# Patient Record
Sex: MALE | Race: WHITE | NOT HISPANIC OR LATINO
[De-identification: names, ages, dates, MRNs, and addresses within clinical notes are randomized per-mention and may not be internally consistent; named-entity substitution may affect disease eponyms.]

---

## 2017-01-02 ENCOUNTER — FORM ENCOUNTER (OUTPATIENT)
Age: 67
End: 2017-01-02

## 2017-01-03 ENCOUNTER — APPOINTMENT (OUTPATIENT)
Dept: MRI IMAGING | Facility: CLINIC | Age: 67
End: 2017-01-03

## 2017-01-03 ENCOUNTER — OUTPATIENT (OUTPATIENT)
Dept: OUTPATIENT SERVICES | Facility: HOSPITAL | Age: 67
LOS: 1 days | End: 2017-01-03
Payer: MEDICARE

## 2017-01-03 DIAGNOSIS — Z00.8 ENCOUNTER FOR OTHER GENERAL EXAMINATION: ICD-10-CM

## 2017-01-03 PROCEDURE — 73721 MRI JNT OF LWR EXTRE W/O DYE: CPT

## 2017-01-03 PROCEDURE — 72148 MRI LUMBAR SPINE W/O DYE: CPT

## 2017-01-05 DIAGNOSIS — M43.16 SPONDYLOLISTHESIS, LUMBAR REGION: ICD-10-CM

## 2017-01-05 DIAGNOSIS — M17.12 UNILATERAL PRIMARY OSTEOARTHRITIS, LEFT KNEE: ICD-10-CM

## 2017-01-05 DIAGNOSIS — M51.26 OTHER INTERVERTEBRAL DISC DISPLACEMENT, LUMBAR REGION: ICD-10-CM

## 2017-01-05 DIAGNOSIS — M48.06 SPINAL STENOSIS, LUMBAR REGION: ICD-10-CM

## 2017-01-26 ENCOUNTER — APPOINTMENT (OUTPATIENT)
Dept: ORTHOPEDIC SURGERY | Facility: CLINIC | Age: 67
End: 2017-01-26

## 2017-02-13 ENCOUNTER — APPOINTMENT (OUTPATIENT)
Dept: ORTHOPEDIC SURGERY | Facility: CLINIC | Age: 67
End: 2017-02-13

## 2017-03-07 ENCOUNTER — APPOINTMENT (OUTPATIENT)
Dept: NEUROSURGERY | Facility: CLINIC | Age: 67
End: 2017-03-07

## 2017-03-07 VITALS
SYSTOLIC BLOOD PRESSURE: 132 MMHG | DIASTOLIC BLOOD PRESSURE: 80 MMHG | BODY MASS INDEX: 27.35 KG/M2 | WEIGHT: 220 LBS | HEIGHT: 75 IN | HEART RATE: 65 BPM

## 2017-03-07 DIAGNOSIS — M47.817 SPONDYLOSIS W/OUT MYELOPATHY OR RADICULOPATHY, LUMBOSACRAL REGION: ICD-10-CM

## 2017-03-07 DIAGNOSIS — M54.16 RADICULOPATHY, LUMBAR REGION: ICD-10-CM

## 2017-03-20 ENCOUNTER — APPOINTMENT (OUTPATIENT)
Dept: NEUROSURGERY | Facility: CLINIC | Age: 67
End: 2017-03-20

## 2017-03-23 ENCOUNTER — APPOINTMENT (OUTPATIENT)
Dept: ORTHOPEDIC SURGERY | Facility: CLINIC | Age: 67
End: 2017-03-23

## 2017-03-23 DIAGNOSIS — M48.07 SPINAL STENOSIS, LUMBOSACRAL REGION: ICD-10-CM

## 2018-03-21 ENCOUNTER — TRANSCRIPTION ENCOUNTER (OUTPATIENT)
Age: 68
End: 2018-03-21

## 2018-05-16 ENCOUNTER — APPOINTMENT (OUTPATIENT)
Dept: ORTHOPEDIC SURGERY | Facility: CLINIC | Age: 68
End: 2018-05-16
Payer: MEDICARE

## 2018-05-16 VITALS
HEART RATE: 76 BPM | HEIGHT: 75 IN | SYSTOLIC BLOOD PRESSURE: 129 MMHG | DIASTOLIC BLOOD PRESSURE: 71 MMHG | BODY MASS INDEX: 27.35 KG/M2 | WEIGHT: 220 LBS

## 2018-05-16 PROCEDURE — 73565 X-RAY EXAM OF KNEES: CPT

## 2018-05-16 PROCEDURE — 73560 X-RAY EXAM OF KNEE 1 OR 2: CPT | Mod: LT

## 2018-05-16 PROCEDURE — 99213 OFFICE O/P EST LOW 20 MIN: CPT

## 2018-06-08 ENCOUNTER — APPOINTMENT (OUTPATIENT)
Dept: ORTHOPEDIC SURGERY | Facility: CLINIC | Age: 68
End: 2018-06-08
Payer: MEDICARE

## 2018-06-08 VITALS
DIASTOLIC BLOOD PRESSURE: 75 MMHG | HEART RATE: 68 BPM | SYSTOLIC BLOOD PRESSURE: 116 MMHG | WEIGHT: 220 LBS | HEIGHT: 75 IN | BODY MASS INDEX: 27.35 KG/M2

## 2018-06-08 PROCEDURE — 20610 DRAIN/INJ JOINT/BURSA W/O US: CPT | Mod: LT

## 2018-06-08 PROCEDURE — 99213 OFFICE O/P EST LOW 20 MIN: CPT | Mod: 25

## 2018-06-08 RX ORDER — AMOXICILLIN 875 MG/1
875 TABLET, FILM COATED ORAL
Qty: 15 | Refills: 0 | Status: COMPLETED | COMMUNITY
Start: 2018-01-26

## 2018-06-08 RX ORDER — AMOXICILLIN AND CLAVULANATE POTASSIUM 875; 125 MG/1; MG/1
875-125 TABLET, COATED ORAL
Qty: 14 | Refills: 0 | Status: COMPLETED | COMMUNITY
Start: 2018-02-25

## 2018-06-08 RX ORDER — OSELTAMIVIR PHOSPHATE 75 MG/1
75 CAPSULE ORAL
Qty: 10 | Refills: 0 | Status: COMPLETED | COMMUNITY
Start: 2018-03-21

## 2018-06-15 ENCOUNTER — APPOINTMENT (OUTPATIENT)
Dept: ORTHOPEDIC SURGERY | Facility: CLINIC | Age: 68
End: 2018-06-15
Payer: MEDICARE

## 2018-06-15 PROCEDURE — 20610 DRAIN/INJ JOINT/BURSA W/O US: CPT | Mod: LT

## 2018-06-22 ENCOUNTER — APPOINTMENT (OUTPATIENT)
Dept: ORTHOPEDIC SURGERY | Facility: CLINIC | Age: 68
End: 2018-06-22

## 2018-06-29 ENCOUNTER — APPOINTMENT (OUTPATIENT)
Dept: ORTHOPEDIC SURGERY | Facility: CLINIC | Age: 68
End: 2018-06-29
Payer: MEDICARE

## 2018-06-29 PROCEDURE — 20610 DRAIN/INJ JOINT/BURSA W/O US: CPT | Mod: LT

## 2019-02-20 ENCOUNTER — APPOINTMENT (OUTPATIENT)
Dept: ORTHOPEDIC SURGERY | Facility: CLINIC | Age: 69
End: 2019-02-20
Payer: MEDICARE

## 2019-02-20 PROCEDURE — 99214 OFFICE O/P EST MOD 30 MIN: CPT

## 2019-03-27 ENCOUNTER — APPOINTMENT (OUTPATIENT)
Dept: ORTHOPEDIC SURGERY | Facility: CLINIC | Age: 69
End: 2019-03-27
Payer: MEDICARE

## 2019-03-27 VITALS — HEIGHT: 75 IN | WEIGHT: 220 LBS | BODY MASS INDEX: 27.35 KG/M2

## 2019-03-27 DIAGNOSIS — M21.162 VARUS DEFORMITY, NOT ELSEWHERE CLASSIFIED, LEFT KNEE: ICD-10-CM

## 2019-03-27 PROCEDURE — 73564 X-RAY EXAM KNEE 4 OR MORE: CPT | Mod: LT

## 2019-03-27 PROCEDURE — 99204 OFFICE O/P NEW MOD 45 MIN: CPT

## 2019-03-27 PROCEDURE — 73560 X-RAY EXAM OF KNEE 1 OR 2: CPT | Mod: RT

## 2019-03-27 PROCEDURE — 99214 OFFICE O/P EST MOD 30 MIN: CPT

## 2019-03-27 NOTE — HISTORY OF PRESENT ILLNESS
[de-identified] : 69 year old male referred by Dr. Jean presents for initial evaluation of left knee pain for the past 10-15 years, worse over the last few months. Patient denies any specific injury. His pain is located diffusely and is sharp and dull intermittently. He reports swelling, clicking, and loss of motion, but denies any other mechanical symptoms. He has trouble with normal activities and finds walking > 1/2 block to be painful. Patient takes the stairs one at a time using the handrail and finds them painful. Patient had steroid injections 2x and viscosupplementation 1x with no relief. He also reports history of a meniscus surgery 10-20 yrs ago. At this point he is very miserable and finds his left knee pain to impede his normal activities. Of note, he has history of CAD and had stents placed 8 years ago after an MI. He is on Plavix and is currently under care of his cardiologist. Today, he would like to discuss his treatment options with Dr. Najera, including TKR.

## 2019-03-27 NOTE — ADDENDUM
[FreeTextEntry1] : This note was written by Pat Scott on 03/27/2019 acting as scribe for Dr. Adolfo Najera M.D.\par \par I, Dr. Adolfo Najera M.D., have read and attest that all the information, medical decision making and discharge instructions within are true and accurate.\par

## 2019-03-27 NOTE — DISCUSSION/SUMMARY
[de-identified] : Discussed at length the nature of the patients condition. Their left knee symptoms appear secondary to degenerative arthritis with varus deformity. We discussed at length the natural history of LEFT knee degenerative arthritis and reviewed non-operative and operative treatment. Due to the pain, failure of prior nonoperative treatment including injections, NSAIDs, and physiotherapy, and associated disability I recommend a LEFT total knee replacement. The risks, benefits, convalescence and alternatives were reviewed. Numerous questions were asked and answered. Models were used as an educational tool. Surgery will be scheduled at a convenient time. Macro sizes should be available. Preop medical and cardiac clearance. He will need to come off Plavix preoperatively. This was explained in the presence of their wife.

## 2019-03-27 NOTE — PHYSICAL EXAM
[de-identified] : General appearance: well nourished and hydrated, pleasant, alert and oriented x 3, cooperative.\par HEENT: Normocephalic, EOM intact, Nasal septum midline, Oral cavity clear, External auditory canal clear.\par Cardiovascular: no apparent abnormalities, no lower leg edema, no varicosities, pedal pulses are palpable.\par Lymphatics Lymph nodes: none palpated, Lymphedema: not present.\par Neurologic: sensation is normal, no muscle weakness in upper or lower extremities, patella tendon reflexes intact .\par Dermatologic no apparent skin lesions, moist, warm, no rash.\par Spine: cervical spine appears normal and moves freely, thoracic spine appears normal and moves freely, lumbosacral spine appears normal and moves freely.\par Gait: nonantalgic.\par \par Left knee\par Inspection: no effusion or erythema.\par Wounds: none.\par Alignment: 5 degrees varus alignment \par Palpation: no specific tenderness on palpation.\par ROM active (in degrees): 5 degree flexion contracture, 5-110 with pain and crepitus \par Ligamentous laxity: all ligaments appear stable,, negative ant. drawer test, negative post. drawer test, stable to varus stress test, stable to valgus stress test. negative Lachman's test, negative pivot shift test\par Meniscal Test: negative McMurrays, negative Jose.\par Patellofemoral Alignment Test: Q angle-, normal.\par Muscle Test: good quad strength.\par Leg examination: calf is soft and non-tender.\par \par Right knee\par Inspection: no effusion or erythema.\par Wounds: none.\par Alignment: normal.\par Palpation: no specific tenderness on palpation.\par ROM active (in degrees): 0-130 with mild crepitus \par Ligamentous laxity: all ligaments appear stable,, negative ant. drawer test, negative post. drawer test, stable to varus stress test, stable to valgus stress test. negative Lachman's test, negative pivot shift test\par Meniscal Test: negative McMurrays, negative Jose.\par Patellofemoral Alignment Test: Q angle-, normal.\par Muscle Test: good quad strength.\par Leg examination: calf is soft and non-tender.\par \par Left hip\par Inspection: No swelling or ecchymosis.\par Wounds: none.\par Palpation: non-tender.\par Stability: no instability.\par Strength: 5/5 all motor groups.\par ROM: no pain with FROM.\par Leg length: equal.\par \par Right hip\par Inspection: No swelling or ecchymosis.\par Wounds: none.\par Palpation: non-tender.\par Stability: no instability.\par Strength: 5/5 all motor groups.\par ROM: no pain with FROM.\par Leg length: equal.\par \par Left ankle\par Inspection: no erythema noted, no swelling noted.\par Palpation: no pain on palpation .\par ROM: FROM without crepitus.\par Muscle strength: 5/5.\par Stability: no instability noted.\par \par Right ankle\par Inspection: no erythema noted, no swelling noted.\par ROM: FROM without crepitus.\par Palpation: no pain on palpation .\par Muscle strength: 5/5.\par Stability: no instability noted.\par \par Left foot\par Inspection: color, texture and turgor are normal.\par ROM: full range of motion of all joints without pain or crepitus.\par Palpation: no tenderness.\par Stability: no instability noted.\par \par Right foot\par Inspection: color, texture and turgor are normal.\par ROM: full range of motion of all joints without pain or crepitus.\par Palpation: no tenderness.\par Stability: no instability noted.\par \par bilateral feet: mild pes planus\par \par Left shoulder\par Inspection: no muscle asymmetry, no atrophy.\par Palpation: no tenderness noted, ACJ non-tender.\par ROM: full active ROM, full passive ROM.\par Strength testing): anterior deltoid, supraspinatus, infraspinatus, subscapularis all 5/5.\par Stability test: ant. apprehension negative, post. apprehension negative, relocation test negative.\par Inpingement Test: negative NEER.\par \par Right shoulder\par Inspection: no muscle asymmetry, no atrophy.\par Palpation: no tenderness noted, ACJ non-tender.\par ROM: full active ROM, full passive ROM.\par Strength testing): anterior deltoid, supraspinatus, infraspinatus, subscapularis all 5/5.\par Stability test: ant. apprehension negative, post. apprehension negative, relocation test negative.\par Inpingement Test: negative NEER.\par Surgical Wounds: none.\par \par Left elbow\par Inspection: negative swelling.\par Wounds: none.\par Palpation: non-tender.\par ROM: full ROM.\par Strength: 5/5 all groups.\par Stability: no instability.\par Mass: none.\par \par Right elbow\par Inspection: negative swelling.\par Wounds: none.\par Palpation: non-tender.\par ROM: full ROM.\par Strength: 5/5 all groups.\par Stability: no instability.\par Mass: none.\par \par Left wrist\par Inspection: negative swelling.\par Wound: none.\par Palpation (bone): no tenderness.\par ROM: full ROM.\par Strength: full , good.\par \par Right wrist\par Inspection: negative swelling.\par Wound: none.\par Palpation (bone): no tenderness.\par ROM: full ROM.\par Strength: full , good.\par \par Left hand\par Inspection: no skin changes, normal appearance.\par Wounds: none.\par Strength: full , able to make full fist.\par Sensation: light touch intact all fingers and thumb.\par Vascular: good capillary refill < 3 seconds, all fingers and thumb.\par Mass: none.\par \par Right hand\par Inspection: no skin changes, normal appearance. \par Wounds: none.\par Palpation: non-tender throughout.\par Strength: full , able to make full fist.\par Sensation: light touch intact all fingers and thumb.\par Vascular: good capillary refill < 3 seconds, all fingers and thumb.\par Mass: none.\par   [de-identified] : Left knee xrays, standing AP/Lateral, Merchant, and 45 degree PA standing view, taken at the office today shows diffuse tricompartmental degenerative arthritis, medial joint space narrowing, sclerosis, bone on bone, marginal osteophytes,  patellofemoral joint space narrowing, Kellgren and Ezio grade 4, slight varus deformity, speckled calcification posterior vessels \par \par Right knee xray merchant view taken at the office today shows the patella in a central position with joint space narrowing and small peripheral osteophytes consistent with patellofemoral arthritis

## 2019-07-02 ENCOUNTER — OUTPATIENT (OUTPATIENT)
Dept: OUTPATIENT SERVICES | Facility: HOSPITAL | Age: 69
LOS: 1 days | Discharge: HOME | End: 2019-07-02

## 2019-07-02 VITALS
TEMPERATURE: 97 F | OXYGEN SATURATION: 100 % | WEIGHT: 210.98 LBS | DIASTOLIC BLOOD PRESSURE: 66 MMHG | RESPIRATION RATE: 18 BRPM | SYSTOLIC BLOOD PRESSURE: 110 MMHG | HEART RATE: 58 BPM | HEIGHT: 75 IN

## 2019-07-02 DIAGNOSIS — Z01.818 ENCOUNTER FOR OTHER PREPROCEDURAL EXAMINATION: ICD-10-CM

## 2019-07-02 DIAGNOSIS — M21.162 VARUS DEFORMITY, NOT ELSEWHERE CLASSIFIED, LEFT KNEE: ICD-10-CM

## 2019-07-02 LAB
ANION GAP SERPL CALC-SCNC: 7 MMOL/L — SIGNIFICANT CHANGE UP (ref 5–17)
APTT BLD: 30 SEC — SIGNIFICANT CHANGE UP (ref 28.5–37)
BASOPHILS # BLD AUTO: 0.04 K/UL — SIGNIFICANT CHANGE UP (ref 0–0.2)
BASOPHILS NFR BLD AUTO: 0.5 % — SIGNIFICANT CHANGE UP (ref 0–2)
BUN SERPL-MCNC: 22 MG/DL — SIGNIFICANT CHANGE UP (ref 7–23)
CALCIUM SERPL-MCNC: 8.9 MG/DL — SIGNIFICANT CHANGE UP (ref 8.5–10.1)
CHLORIDE SERPL-SCNC: 108 MMOL/L — SIGNIFICANT CHANGE UP (ref 96–108)
CO2 SERPL-SCNC: 28 MMOL/L — SIGNIFICANT CHANGE UP (ref 22–31)
CREAT SERPL-MCNC: 0.9 MG/DL — SIGNIFICANT CHANGE UP (ref 0.5–1.3)
EOSINOPHIL # BLD AUTO: 0.13 K/UL — SIGNIFICANT CHANGE UP (ref 0–0.5)
EOSINOPHIL NFR BLD AUTO: 1.7 % — SIGNIFICANT CHANGE UP (ref 0–6)
GLUCOSE SERPL-MCNC: 83 MG/DL — SIGNIFICANT CHANGE UP (ref 70–99)
HBA1C BLD-MCNC: 5.4 % — SIGNIFICANT CHANGE UP (ref 4–5.6)
HCT VFR BLD CALC: 48.9 % — SIGNIFICANT CHANGE UP (ref 39–50)
HGB BLD-MCNC: 16.6 G/DL — SIGNIFICANT CHANGE UP (ref 13–17)
IMM GRANULOCYTES NFR BLD AUTO: 0.3 % — SIGNIFICANT CHANGE UP (ref 0–1.5)
INR BLD: 1.03 RATIO — SIGNIFICANT CHANGE UP (ref 0.88–1.16)
LYMPHOCYTES # BLD AUTO: 1.62 K/UL — SIGNIFICANT CHANGE UP (ref 1–3.3)
LYMPHOCYTES # BLD AUTO: 21.2 % — SIGNIFICANT CHANGE UP (ref 13–44)
MCHC RBC-ENTMCNC: 32.1 PG — SIGNIFICANT CHANGE UP (ref 27–34)
MCHC RBC-ENTMCNC: 33.9 GM/DL — SIGNIFICANT CHANGE UP (ref 32–36)
MCV RBC AUTO: 94.6 FL — SIGNIFICANT CHANGE UP (ref 80–100)
MONOCYTES # BLD AUTO: 0.66 K/UL — SIGNIFICANT CHANGE UP (ref 0–0.9)
MONOCYTES NFR BLD AUTO: 8.7 % — SIGNIFICANT CHANGE UP (ref 2–14)
MRSA PCR RESULT.: SIGNIFICANT CHANGE UP
NEUTROPHILS # BLD AUTO: 5.16 K/UL — SIGNIFICANT CHANGE UP (ref 1.8–7.4)
NEUTROPHILS NFR BLD AUTO: 67.6 % — SIGNIFICANT CHANGE UP (ref 43–77)
NRBC # BLD: 0 /100 WBCS — SIGNIFICANT CHANGE UP (ref 0–0)
PLATELET # BLD AUTO: 211 K/UL — SIGNIFICANT CHANGE UP (ref 150–400)
POTASSIUM SERPL-MCNC: 4.3 MMOL/L — SIGNIFICANT CHANGE UP (ref 3.5–5.3)
POTASSIUM SERPL-SCNC: 4.3 MMOL/L — SIGNIFICANT CHANGE UP (ref 3.5–5.3)
PROTHROM AB SERPL-ACNC: 11.5 SEC — SIGNIFICANT CHANGE UP (ref 10–12.9)
RBC # BLD: 5.17 M/UL — SIGNIFICANT CHANGE UP (ref 4.2–5.8)
RBC # FLD: 13.2 % — SIGNIFICANT CHANGE UP (ref 10.3–14.5)
S AUREUS DNA NOSE QL NAA+PROBE: DETECTED
SODIUM SERPL-SCNC: 143 MMOL/L — SIGNIFICANT CHANGE UP (ref 135–145)
WBC # BLD: 7.63 K/UL — SIGNIFICANT CHANGE UP (ref 3.8–10.5)
WBC # FLD AUTO: 7.63 K/UL — SIGNIFICANT CHANGE UP (ref 3.8–10.5)

## 2019-07-02 NOTE — H&P PST ADULT - HISTORY OF PRESENT ILLNESS
70 yo male c/o left knee pain secondary to osteoarthritis- scheduled for left knee arthroplasty. PMH- MI s/p stent, htn, hld

## 2019-07-02 NOTE — H&P PST ADULT - ASSESSMENT
left knee osteoarthritis left knee osteoarthritis  CAPRINI SCORE    AGE RELATED RISK FACTORS                                                       MOBILITY RELATED FACTORS  [ ] Age 41-60 years                                            (1 Point)                  [ ] Bed rest                                                        (1 Point)  [x ] Age: 61-74 years                                           (2 Points)                [ ] Plaster cast                                                   (2 Points)  [ ] Age= 75 years                                              (3 Points)                 [ ] Bed bound for more than 72 hours                   (2 Points)    DISEASE RELATED RISK FACTORS                                               GENDER SPECIFIC FACTORS  [ ] Edema in the lower extremities                       (1 Point)                  [ ] Pregnancy                                                     (1 Point)  [ ] Varicose veins                                               (1 Point)                  [ ] Post-partum < 6 weeks                                   (1 Point)             [x ] BMI > 25 Kg/m2                                            (1 Point)                  [ ] Hormonal therapy  or oral contraception            (1 Point)                 [ ] Sepsis (in the previous month)                        (1 Point)                  [ ] History of pregnancy complications  [ ] Pneumonia or serious lung disease                                               [ ] Unexplained or recurrent                       (1 Point)           (in the previous month)                               (1 Point)  [ ] Abnormal pulmonary function test                     (1 Point)                 SURGERY RELATED RISK FACTORS  [ ] Acute myocardial infarction                              (1 Point)                 [ ]  Section                                            (1 Point)  [ ] Congestive heart failure (in the previous month)  (1 Point)                 [ ] Minor surgery                                                 (1 Point)   [ ] Inflammatory bowel disease                             (1 Point)                 [ ] Arthroscopic surgery                                        (2 Points)  [ ] Central venous access                                    (2 Points)                [ ] General surgery lasting more than 45 minutes   (2 Points)       [ ] Stroke (in the previous month)                          (5 Points)               [ x] Elective arthroplasty                                        (5 Points)                                                                                                                                               HEMATOLOGY RELATED FACTORS                                                 TRAUMA RELATED RISK FACTORS  [ ] Prior episodes of VTE                                     (3 Points)                 [ ] Fracture of the hip, pelvis, or leg                       (5 Points)  [ ] Positive family history for VTE                         (3 Points)                 [ ] Acute spinal cord injury (in the previous month)  (5 Points)  [ ] Prothrombin 72652 A                                      (3 Points)                 [ ] Paralysis  (less than 1 month)                          (5 Points)  [ ] Factor V Leiden                                             (3 Points)                 [ ] Multiple Trauma within 1 month                         (5 Points)  [ ] Lupus anticoagulants                                     (3 Points)                                                           [ ] Anticardiolipin antibodies                                (3 Points)                                                       [ ] High homocysteine in the blood                      (3 Points)                                             [ ] Other congenital or acquired thrombophilia       (3 Points)                                                [ ] Heparin induced thrombocytopenia                  (3 Points)                                          Total Score [     8     ]

## 2019-07-02 NOTE — PHYSICAL THERAPY INITIAL EVALUATION ADULT - MODIFIED CLINICAL TEST OF SENSORY INTEGRATION IN BALANCE TEST
5x sit to stand = 17.58 seconds. 2MWT = 175ft without device (antalgic gait, + trendelenberg). Stairs: step over step with R rail for ascent

## 2019-07-02 NOTE — H&P PST ADULT - NSICDXPROBLEM_GEN_ALL_CORE_FT
PROBLEM DIAGNOSES  Problem: Osteoarthritis  Assessment and Plan: scheduled for left knee arthroplasty    Problem: HLD (hyperlipidemia)  Assessment and Plan: continue meds    Problem: Status post cardiac catheterization  Assessment and Plan: continue meds

## 2019-07-02 NOTE — PHYSICAL THERAPY INITIAL EVALUATION ADULT - ADDITIONAL COMMENTS
Pt lives with family (whom can assist post-op) in a private home, no entry steps, 10 steps to 2nd level (+ L rail to ascend). Pt has a walk in shower stall, no grab bars, fixed shower head, & standard toilet seat height. Pt states 3:1 commode can fit in bathroom. Pt is independent with all functional mobility including community ambulation without a device & ADL's. Pt does not own any DMEs. Pt states pain is 4/10 at rest & 6/10 with activity. Pain is worse with walking & initial OOB in AM. Pt takes Aleve PRN. Pt wears eye glasses for distance, is right hand dominant, drives, & is retired (sold food ingredients).

## 2019-07-05 RX ORDER — MUPIROCIN 20 MG/G
1 OINTMENT TOPICAL
Qty: 22 | Refills: 0
Start: 2019-07-05 | End: 2019-07-09

## 2019-07-08 ENCOUNTER — RX RENEWAL (OUTPATIENT)
Age: 69
End: 2019-07-08

## 2019-07-08 DIAGNOSIS — Z98.890 OTHER SPECIFIED POSTPROCEDURAL STATES: ICD-10-CM

## 2019-07-08 DIAGNOSIS — E78.5 HYPERLIPIDEMIA, UNSPECIFIED: ICD-10-CM

## 2019-07-08 DIAGNOSIS — M19.90 UNSPECIFIED OSTEOARTHRITIS, UNSPECIFIED SITE: ICD-10-CM

## 2019-07-16 ENCOUNTER — APPOINTMENT (OUTPATIENT)
Dept: ORTHOPEDIC SURGERY | Facility: HOSPITAL | Age: 69
End: 2019-07-16

## 2019-07-16 ENCOUNTER — TRANSCRIPTION ENCOUNTER (OUTPATIENT)
Age: 69
End: 2019-07-16

## 2019-07-16 ENCOUNTER — OUTPATIENT (OUTPATIENT)
Dept: OUTPATIENT SERVICES | Facility: HOSPITAL | Age: 69
LOS: 1 days | Discharge: HOME HEALTH | End: 2019-07-16

## 2019-07-16 ENCOUNTER — RESULT REVIEW (OUTPATIENT)
Age: 69
End: 2019-07-16

## 2019-07-16 RX ORDER — FAMOTIDINE 10 MG/ML
1 INJECTION INTRAVENOUS
Qty: 0 | Refills: 0 | DISCHARGE

## 2019-07-16 RX ORDER — CLOPIDOGREL BISULFATE 75 MG/1
1 TABLET, FILM COATED ORAL
Qty: 0 | Refills: 0 | DISCHARGE

## 2019-07-16 RX ORDER — ATORVASTATIN CALCIUM 80 MG/1
1 TABLET, FILM COATED ORAL
Qty: 0 | Refills: 0 | DISCHARGE

## 2019-07-16 RX ORDER — METOPROLOL TARTRATE 50 MG
1 TABLET ORAL
Qty: 0 | Refills: 0 | DISCHARGE

## 2019-07-16 RX ORDER — ASPIRIN/CALCIUM CARB/MAGNESIUM 324 MG
1 TABLET ORAL
Qty: 0 | Refills: 0 | DISCHARGE

## 2019-07-17 ENCOUNTER — TRANSCRIPTION ENCOUNTER (OUTPATIENT)
Age: 69
End: 2019-07-17

## 2019-07-19 ENCOUNTER — CHART COPY (OUTPATIENT)
Age: 69
End: 2019-07-19

## 2019-07-26 DIAGNOSIS — Z79.02 LONG TERM (CURRENT) USE OF ANTITHROMBOTICS/ANTIPLATELETS: ICD-10-CM

## 2019-07-26 DIAGNOSIS — Z79.82 LONG TERM (CURRENT) USE OF ASPIRIN: ICD-10-CM

## 2019-07-26 DIAGNOSIS — M17.12 UNILATERAL PRIMARY OSTEOARTHRITIS, LEFT KNEE: ICD-10-CM

## 2019-07-26 DIAGNOSIS — Z95.5 PRESENCE OF CORONARY ANGIOPLASTY IMPLANT AND GRAFT: ICD-10-CM

## 2019-07-26 DIAGNOSIS — E78.00 PURE HYPERCHOLESTEROLEMIA, UNSPECIFIED: ICD-10-CM

## 2019-07-26 DIAGNOSIS — I25.2 OLD MYOCARDIAL INFARCTION: ICD-10-CM

## 2019-07-29 ENCOUNTER — CHART COPY (OUTPATIENT)
Age: 69
End: 2019-07-29

## 2019-08-07 ENCOUNTER — APPOINTMENT (OUTPATIENT)
Dept: ORTHOPEDIC SURGERY | Facility: CLINIC | Age: 69
End: 2019-08-07
Payer: MEDICARE

## 2019-08-07 PROCEDURE — 99024 POSTOP FOLLOW-UP VISIT: CPT

## 2019-08-07 NOTE — PROCEDURE
[de-identified] : Left knee staples removed completely\par Observation on incision dry, clean, intact, well healed. Site cleaned with iodine swab after staples were completely removed. Instructions keep incision dry and clean, allowed to shower and pat site dry, do not rub dry. Contact office if site becomes red, swollen, infected, or you develop a fever.\par

## 2019-08-07 NOTE — HISTORY OF PRESENT ILLNESS
[Doing Well] : is doing well [Clean/Dry/Intact] : clean, dry and intact [Adequate Pain Control] : has adequate pain control [Staples Removed] : staples were removed [de-identified] : 3 weeks s/p Left TKR for staple removal  [de-identified] : ROM 5-90 [de-identified] : Patient presents today 3 weeks s/p Left TKR for staple removal. Patient states that he is doing well, taking Oxycodone and Tylenol for pain, and doing outpatient therapy twice a week. Taking Aspirin 325mg twice a day for anticoagulation, as well as Plavix. I went over the operative report with the patient, and they received a copy of the report for their own records.\par  [de-identified] : Continue Oxycodone and Tylenol as needed for pain relief. Continue physical therapy. Continue anticoagulation. Follow up in 3 weeks with x-rays.

## 2019-08-28 ENCOUNTER — APPOINTMENT (OUTPATIENT)
Dept: ORTHOPEDIC SURGERY | Facility: CLINIC | Age: 69
End: 2019-08-28
Payer: MEDICARE

## 2019-08-28 PROCEDURE — 73562 X-RAY EXAM OF KNEE 3: CPT | Mod: LT

## 2019-08-28 PROCEDURE — 99024 POSTOP FOLLOW-UP VISIT: CPT

## 2019-08-28 PROCEDURE — 73560 X-RAY EXAM OF KNEE 1 OR 2: CPT | Mod: RT

## 2019-08-28 NOTE — HISTORY OF PRESENT ILLNESS
[de-identified] : 6 weeks s/p Left TKR  [de-identified] : Patient is 6 weeks following left TKR. She is making great progress with PT. She is only taking pain meds for PT. She does take aspirin and Plavix for her heart. She would like to take topical cream antiinflammatory Voltaren if Dr. Najera agrees with it. He would like a new prescription to continue PT.  [de-identified] : Left Knee\par Inspection: no effusion, mild soft tissue swelling\par Wounds: healed midline incision\par Alignment: normal.\par Palpation: no specific tenderness on palpation.\par ROM: Active (in degrees): 0-115\par Ligamentous laxity (neg): negative ant. drawer test, negative post. drawer test, stable to varus stress test, stable to valgus stress test,\par Patellofemoral Alignment Test: Q angle-, normal.\par Muscle Test: good quad strength.\par Leg examination: calf is soft and non-tender.\par \par Right Knee\par Inspection: no effusion\par Wounds: none\par Alignment: normal.\par Palpation: no specific tenderness on palpation.\par ROM: Active (in degrees):\par Ligamentous laxity (neg): negative ant. drawer test, negative post. drawer test, stable to varus stress test, stable to valgus stress test,\par Patellofemoral Alignment Test: Q angle-, normal.\par Muscle Test: good quad strength.\par Leg examination: calf is soft and non-tender. [de-identified] : Knee X-Ray: \par Left Knee x-rays taken at the office today show:, standing AP/Lateral and Merchant films, and 45 degree PA standing view, normal alignment, good joint space maintained, well centered patella, no acute or chronic abnormalities \par \par Right knee xray merchant view taken at the office today demonstrates joint space narrowing with patellofemoral arthritis, marginal osteophytes  [de-identified] : He is doing well. He will continue with PT activities as tolerated. Follow up 6-8 weeks with xrays.

## 2019-08-28 NOTE — ADDENDUM
[FreeTextEntry1] : This note was written by Jazmín Sandhu on 08/28/2019 acting as scribe for Dr. Adolfo Najera M.D.\par \par I, Dr. Adolfo Najera, have read and attest that all the information, medical decision making and discharge instructions within are true and accurate.

## 2019-10-11 ENCOUNTER — APPOINTMENT (OUTPATIENT)
Dept: ORTHOPEDIC SURGERY | Facility: CLINIC | Age: 69
End: 2019-10-11
Payer: MEDICARE

## 2019-10-11 PROCEDURE — 73560 X-RAY EXAM OF KNEE 1 OR 2: CPT | Mod: RT

## 2019-10-11 PROCEDURE — 73562 X-RAY EXAM OF KNEE 3: CPT | Mod: LT

## 2019-10-11 PROCEDURE — 99024 POSTOP FOLLOW-UP VISIT: CPT

## 2019-10-11 NOTE — HISTORY OF PRESENT ILLNESS
[de-identified] : 69 year old male presents for follow up evaluation of s/p left TKR 3 months. Patient is doing well. He reports having no major issues or complaints. He walks for exercise and is overall happy with his progress.

## 2019-10-11 NOTE — ADDENDUM
[FreeTextEntry1] : This note was written by Nancy Sanchez on 10/11/2019 acting as scribe for Dr. Adolfo Najera M.D.\par \par I, Dr. Adolfo Najera M.D., have read and attest that all the information, medical decision making and discharge instructions within are true and accurate.\par

## 2019-10-11 NOTE — PHYSICAL EXAM
[de-identified] : Left knee xrays, AP, lateral, merchant view taken at the office today demonstrates a total knee replacement in satisfactory position and alignment. No evidence of loosening. The patella sits in a central position  [de-identified] : Left Knee\par Inspection: no effusion\par Wounds: healed midline incision\par Alignment: normal.\par Palpation: no specific tenderness on palpation.\par ROM: Active (in degrees) 0-125\par Ligamentous laxity (neg): negative ant. drawer test, negative post. drawer test, stable to varus stress test, stable to valgus stress test,\par Patellofemoral Alignment Test: Q angle-, normal.\par Muscle Test: good quad strength.\par Leg examination: calf is soft and non-tender.\par

## 2019-10-11 NOTE — DISCUSSION/SUMMARY
[de-identified] : Patient is doing well following their left TKR. They will continue activities as tolerated. Patient will follow up with x-rays in 3 months.

## 2019-11-18 ENCOUNTER — FORM ENCOUNTER (OUTPATIENT)
Age: 69
End: 2019-11-18

## 2020-01-10 ENCOUNTER — APPOINTMENT (OUTPATIENT)
Dept: ORTHOPEDIC SURGERY | Facility: CLINIC | Age: 70
End: 2020-01-10
Payer: MEDICARE

## 2020-01-10 DIAGNOSIS — M25.562 PAIN IN LEFT KNEE: ICD-10-CM

## 2020-01-10 DIAGNOSIS — M17.12 UNILATERAL PRIMARY OSTEOARTHRITIS, LEFT KNEE: ICD-10-CM

## 2020-01-10 DIAGNOSIS — Z96.652 AFTERCARE FOLLOWING JOINT REPLACEMENT SURGERY: ICD-10-CM

## 2020-01-10 DIAGNOSIS — Z47.1 AFTERCARE FOLLOWING JOINT REPLACEMENT SURGERY: ICD-10-CM

## 2020-01-10 PROCEDURE — 73562 X-RAY EXAM OF KNEE 3: CPT | Mod: LT

## 2020-01-10 PROCEDURE — 99213 OFFICE O/P EST LOW 20 MIN: CPT

## 2020-01-10 PROCEDURE — 73560 X-RAY EXAM OF KNEE 1 OR 2: CPT | Mod: RT

## 2020-01-13 PROBLEM — Z47.1 AFTERCARE FOLLOWING LEFT KNEE JOINT REPLACEMENT SURGERY: Status: ACTIVE | Noted: 2019-08-28

## 2020-01-28 NOTE — DISCUSSION/SUMMARY
[de-identified] : Patient is doing well following their left TKR at 6 months. They will continue activities as tolerated. Patient will follow up with x-rays in 6 months.

## 2020-01-28 NOTE — PHYSICAL EXAM
[de-identified] : Left Knee\par Inspection: no effusion\par Wounds: healed midline incision\par Alignment: normal.\par Palpation: no specific tenderness on palpation.\par ROM: Active (in degrees) 0-120\par Ligamentous laxity (neg): negative ant. drawer test, negative post. drawer test, stable to varus stress test, stable to valgus stress test,\par Patellofemoral Alignment Test: Q angle-, normal.\par Muscle Test: good quad strength.\par Leg examination: calf is soft and non-tender.\par  [de-identified] : Left knee xrays, AP, lateral, merchant view taken at the office today demonstrates a total knee replacement in satisfactory position and alignment. No evidence of loosening. The patella sits in a central position \par \par Right knee xray merchant view taken at the office today shows the patella in a central position with joint space narrowing and small peripheral osteophytes consistent with patellofemoral arthritis.

## 2020-01-28 NOTE — HISTORY OF PRESENT ILLNESS
[de-identified] : 69 year old male presents for follow up evaluation of s/p left TKR at 6 months. Patient is doing well and has no issues or complaints. He has returned to playing golf. Patient denies taking any medications for his knee. Denies postoperative complications.

## 2020-08-26 ENCOUNTER — APPOINTMENT (OUTPATIENT)
Dept: ORTHOPEDIC SURGERY | Facility: CLINIC | Age: 70
End: 2020-08-26
Payer: MEDICARE

## 2020-08-26 DIAGNOSIS — Z96.652 PRESENCE OF LEFT ARTIFICIAL KNEE JOINT: ICD-10-CM

## 2020-08-26 DIAGNOSIS — M17.12 UNILATERAL PRIMARY OSTEOARTHRITIS, LEFT KNEE: ICD-10-CM

## 2020-08-26 PROCEDURE — 99213 OFFICE O/P EST LOW 20 MIN: CPT

## 2020-08-26 PROCEDURE — 73562 X-RAY EXAM OF KNEE 3: CPT | Mod: LT

## 2020-08-26 PROCEDURE — 73560 X-RAY EXAM OF KNEE 1 OR 2: CPT | Mod: RT

## 2020-08-26 NOTE — HISTORY OF PRESENT ILLNESS
[de-identified] : 70 year old male presents for follow up evaluation of s/p left TKR at 1 year. Patient is doing well and has no issues or complaints. Patient has been participating in their usual physical activities without pain or discomfort. Patient denies taking any medications for his knee. Denies postoperative complications.

## 2020-08-26 NOTE — PHYSICAL EXAM
[de-identified] : General appearance: well nourished and hydrated, pleasant, alert and oriented x 3, cooperative.\par HEENT: Normocephalic, EOM intact, Nasal septum midline, Oral cavity clear, External auditory canal clear.\par Cardiovascular: no apparent abnormalities, no lower leg edema, no varicosities, pedal pulses are palpable.\par Lymphatics Lymph nodes: none palpated, Lymphedema: not present.\par Neurologic: sensation is normal, no muscle weakness in upper or lower extremities, patella tendon reflexes intact .\par Dermatologic no apparent skin lesions, moist, warm, no rash.\par Spine:cervical spine appears normal and moves freely, thoracic spine appears normal and moves freely, lumbosacral spine appears normal and moves freely.\par Gait: nonantalgic. \par \par Left Knee\par Inspection: no effusion\par Wounds: healed midline incision\par Alignment: normal.\par Palpation: no specific tenderness on palpation.\par ROM: Active (in degrees) 0-130\par Ligamentous laxity (neg): negative ant. drawer test, negative post. drawer test, stable to varus stress test, stable to valgus stress test,\par Patellofemoral Alignment Test: Q angle-, normal.\par Muscle Test: good quad strength.\par Leg examination: calf is soft and non-tender.\par \par Left hip\par Inspection: No swelling or ecchymosis.\par Wounds: none.\par Palpation: non-tender.\par Stability: no instability.\par Strength: 5/5 all motor groups.\par ROM: no pain with FROM.\par Leg length: equal. [de-identified] : Left knee xrays, AP, lateral, merchant view taken at the office today demonstrates a total knee replacement in satisfactory position and alignment. No evidence of loosening. The patella sits in a central position \par \par Right knee xray merchant view taken at the office today shows the patella in a central position with joint space narrowing and small peripheral osteophytes consistent with patellofemoral arthritis.

## 2020-08-26 NOTE — ADDENDUM
[FreeTextEntry1] : This note was written by Jen Stone on 08/26/2020 acting as scribe for Dr. Adolfo Najera M.D.\par \par I, Dr. Adolfo Najera, have read and attest that all the information, medical decision making and discharge instructions within are true and accurate.

## 2020-08-26 NOTE — DISCUSSION/SUMMARY
[de-identified] : Patient is doing well following their left TKR at 1 year. I reviewed x-ray films with them. I have reassured him that his implants are functioning well. They will continue activities as tolerated, with no major limitations or restrictions. In regards to his left lower back pain, it does not seem to be persistent at this time. I have referred him to Dr. Lomeli if his pain continues. Encouraged him to keep a good exercise program and stretching. Patient will follow up with x-rays in 1 year.

## 2022-10-28 ENCOUNTER — NON-APPOINTMENT (OUTPATIENT)
Age: 72
End: 2022-10-28

## 2023-05-16 ENCOUNTER — NON-APPOINTMENT (OUTPATIENT)
Age: 73
End: 2023-05-16

## 2023-05-22 ENCOUNTER — NON-APPOINTMENT (OUTPATIENT)
Age: 73
End: 2023-05-22

## 2023-06-14 ENCOUNTER — APPOINTMENT (OUTPATIENT)
Dept: THORACIC SURGERY | Facility: CLINIC | Age: 73
End: 2023-06-14
Payer: MEDICARE

## 2023-06-14 DIAGNOSIS — Z95.5 PRESENCE OF CORONARY ANGIOPLASTY IMPLANT AND GRAFT: ICD-10-CM

## 2023-06-14 DIAGNOSIS — R91.8 OTHER NONSPECIFIC ABNORMAL FINDING OF LUNG FIELD: ICD-10-CM

## 2023-06-14 PROCEDURE — 99204 OFFICE O/P NEW MOD 45 MIN: CPT

## 2023-06-14 RX ORDER — OXYCODONE 5 MG/1
5 TABLET ORAL
Qty: 16 | Refills: 0 | Status: DISCONTINUED | COMMUNITY
Start: 2018-01-26 | End: 2023-06-14

## 2023-06-14 RX ORDER — CHLORHEXIDINE GLUCONATE, 0.12% ORAL RINSE 1.2 MG/ML
0.12 SOLUTION DENTAL
Qty: 473 | Refills: 0 | Status: DISCONTINUED | COMMUNITY
Start: 2018-01-26 | End: 2023-06-14

## 2023-06-14 RX ORDER — DICLOFENAC SODIUM 10 MG/G
1 GEL TOPICAL DAILY
Qty: 1 | Refills: 2 | Status: DISCONTINUED | COMMUNITY
Start: 2019-08-28 | End: 2023-06-14

## 2023-06-14 RX ORDER — CELECOXIB 200 MG/1
200 CAPSULE ORAL
Qty: 2 | Refills: 0 | Status: DISCONTINUED | COMMUNITY
Start: 2019-07-08 | End: 2023-06-14

## 2023-06-14 RX ORDER — OXYCODONE AND ACETAMINOPHEN 5; 325 MG/1; MG/1
5-325 TABLET ORAL
Qty: 40 | Refills: 0 | Status: DISCONTINUED | COMMUNITY
Start: 2019-08-20 | End: 2023-06-14

## 2023-06-14 RX ORDER — OXYCODONE AND ACETAMINOPHEN 5; 325 MG/1; MG/1
5-325 TABLET ORAL
Qty: 40 | Refills: 0 | Status: DISCONTINUED | COMMUNITY
Start: 2019-08-07 | End: 2023-06-14

## 2023-06-14 RX ORDER — TRAMADOL HYDROCHLORIDE 50 MG/1
50 TABLET, COATED ORAL
Qty: 30 | Refills: 0 | Status: DISCONTINUED | COMMUNITY
Start: 2019-08-28 | End: 2023-06-14

## 2023-06-14 RX ORDER — OXYCODONE AND ACETAMINOPHEN 5; 325 MG/1; MG/1
5-325 TABLET ORAL
Qty: 60 | Refills: 0 | Status: DISCONTINUED | COMMUNITY
Start: 2019-07-19 | End: 2023-06-14

## 2023-06-14 RX ORDER — ASPIRIN 325 MG/1
325 TABLET ORAL
Qty: 90 | Refills: 0 | Status: DISCONTINUED | COMMUNITY
Start: 2017-06-19 | End: 2023-06-14

## 2023-06-14 RX ORDER — CLOPIDOGREL BISULFATE 75 MG/1
75 TABLET, FILM COATED ORAL
Qty: 90 | Refills: 0 | Status: DISCONTINUED | COMMUNITY
Start: 2018-04-08 | End: 2023-06-14

## 2023-06-15 PROBLEM — R91.8 LUNG NODULES: Status: ACTIVE | Noted: 2023-06-14

## 2023-06-15 PROBLEM — Z95.5 HISTORY OF HEART ARTERY STENT: Status: RESOLVED | Noted: 2023-06-15 | Resolved: 2023-06-15

## 2023-06-15 NOTE — DATA REVIEWED
[FreeTextEntry1] : Independently reviewed the following:\par - CT Chest on 8/31/21\par - CT chest on 6/7/23

## 2023-06-15 NOTE — PHYSICAL EXAM
[Fully active, able to carry on all pre-disease performance without restriction] : Status 0 - Fully active, able to carry on all pre-disease performance without restriction [General Appearance - Alert] : alert [General Appearance - In No Acute Distress] : in no acute distress [General Appearance - Well Nourished] : well nourished [Sclera] : the sclera and conjunctiva were normal [Extraocular Movements] : extraocular movements were intact [Outer Ear] : the ears and nose were normal in appearance [Hearing Threshold Finger Rub Not Robertson] : hearing was normal [Both Tympanic Membranes Were Examined] : both tympanic membranes were normal [Neck Appearance] : the appearance of the neck was normal [Neck Cervical Mass (___cm)] : no neck mass was observed [Respiration, Rhythm And Depth] : normal respiratory rhythm and effort [Exaggerated Use Of Accessory Muscles For Inspiration] : no accessory muscle use [Auscultation Breath Sounds / Voice Sounds] : lungs were clear to auscultation bilaterally [Heart Rate And Rhythm] : heart rate was normal and rhythm regular [Examination Of The Chest] : the chest was normal in appearance [Chest Visual Inspection Thoracic Asymmetry] : no chest asymmetry [Diminished Respiratory Excursion] : normal chest expansion [2+] : left 2+ [Breast Appearance] : normal in appearance [Breast Palpation Mass] : no palpable masses [Bowel Sounds] : normal bowel sounds [Abdomen Soft] : soft [Abdomen Tenderness] : non-tender [Cervical Lymph Nodes Enlarged Posterior Bilaterally] : posterior cervical [Cervical Lymph Nodes Enlarged Anterior Bilaterally] : anterior cervical [Supraclavicular Lymph Nodes Enlarged Bilaterally] : supraclavicular [No CVA Tenderness] : no ~M costovertebral angle tenderness [No Spinal Tenderness] : no spinal tenderness [Involuntary Movements] : no involuntary movements were seen [Skin Color & Pigmentation] : normal skin color and pigmentation [Skin Turgor] : normal skin turgor [] : no rash [No Focal Deficits] : no focal deficits [Oriented To Time, Place, And Person] : oriented to person, place, and time [FreeTextEntry1] : Deferred

## 2023-06-15 NOTE — ASSESSMENT
[FreeTextEntry1] : Mr. YASMEEN BRIAN, 73 year old male, former smoker (Quit 11/2022), w/ hx of CAD, s/p Stent (Approx 2008). Here today for further evaluation of lung nodules seen on June, 2023 CT. Reports recent upper respiratory infection April - May 2023. s/p Steroids. \par \par I have independently reviewed the medical records and imaging at the time of this office consultation. All available images reviewed. Stable, right apex nodule. In 2021, cyst noted within the left apex, now more solid; Fluid vs tumor. At this time, lesion is not amenable to biopsy or PET/CT due to its small size. Discussed surgical resection for definitive diagnosis versus more conservative approach of repeating a non contrast CT in 6 months. Risks, benefits of both discussed, and at this time, he would like to proceed with surveillance.\par \par Recommendations reviewed with patient during this office visit, and all questions answered; Patient instructed on the importance of follow up and verbalizes understanding.\par \par I, JOANN Jalloh, personally performed the evaluation and management (E/M) services for this new patient. That E/M includes conducting the initial examination, assessing all conditions, and establishing the plan of care. Today, My ACP, Karen Gonzalez, was here to observe my evaluation and management services for this patient to be followed going forward.\par \par \par

## 2023-06-15 NOTE — HISTORY OF PRESENT ILLNESS
[FreeTextEntry1] : Mr. YASMEEN BRIAN, 73 year old male, former smoker (Quit 11/2022), w/ hx of CAD, s/p Stent (Approx 2008). Here today for further evaluation of lung nodules seen on June, 2023 CT. Reports recent upper respiratory infection April - May 2023. s/p Steroids. \par \par CT Chest on 8/31/21: (Shreya)\par -  No acute infiltrate or consolidation is seen. Linear atelectasis/scarring\par is seen in the lingular segment of the MINDY as well as within the RML\par -  3 mm noncalcified pulmonary nodule seen at the right lung apex (series 3 image 17)\par \par CT Chest on 6/7/23: (R)\par - New 7 mm solid nodule in the left apex (image 62 of series 4). \par - Stable 3 mm subpleural solid nodule at the right apex (image 38). \par - Areas of minimal scarring again noted at the lung bases, unchanged. \par \par Patient presents today for consultation. Today, patient denies chest pain, hemoptysis, fever, chills, night sweats, lightheadedness or dizziness.\par

## 2023-07-31 ENCOUNTER — EMERGENCY (EMERGENCY)
Facility: HOSPITAL | Age: 73
LOS: 1 days | Discharge: HOME | End: 2023-07-31
Attending: STUDENT IN AN ORGANIZED HEALTH CARE EDUCATION/TRAINING PROGRAM
Payer: MEDICARE

## 2023-07-31 VITALS
RESPIRATION RATE: 20 BRPM | HEART RATE: 80 BPM | SYSTOLIC BLOOD PRESSURE: 142 MMHG | OXYGEN SATURATION: 96 % | TEMPERATURE: 100 F | DIASTOLIC BLOOD PRESSURE: 95 MMHG

## 2023-07-31 LAB
ALBUMIN SERPL ELPH-MCNC: 4 G/DL — SIGNIFICANT CHANGE UP (ref 3.3–5)
ALP SERPL-CCNC: 97 U/L — SIGNIFICANT CHANGE UP (ref 40–120)
ALT FLD-CCNC: 35 U/L — SIGNIFICANT CHANGE UP (ref 10–45)
ANION GAP SERPL CALC-SCNC: 13 MMOL/L — SIGNIFICANT CHANGE UP (ref 5–17)
APPEARANCE UR: CLEAR — SIGNIFICANT CHANGE UP
APTT BLD: 28.4 SEC — SIGNIFICANT CHANGE UP (ref 24.5–35.6)
AST SERPL-CCNC: 27 U/L — SIGNIFICANT CHANGE UP (ref 10–40)
BACTERIA # UR AUTO: NEGATIVE — SIGNIFICANT CHANGE UP
BASE EXCESS BLDV CALC-SCNC: 1.1 MMOL/L — SIGNIFICANT CHANGE UP (ref -2–3)
BASOPHILS # BLD AUTO: 0.04 K/UL — SIGNIFICANT CHANGE UP (ref 0–0.2)
BASOPHILS NFR BLD AUTO: 0.7 % — SIGNIFICANT CHANGE UP (ref 0–2)
BILIRUB SERPL-MCNC: 0.5 MG/DL — SIGNIFICANT CHANGE UP (ref 0.2–1.2)
BILIRUB UR-MCNC: NEGATIVE — SIGNIFICANT CHANGE UP
BUN SERPL-MCNC: 12 MG/DL — SIGNIFICANT CHANGE UP (ref 7–23)
CA-I SERPL-SCNC: 1.13 MMOL/L — LOW (ref 1.15–1.33)
CALCIUM SERPL-MCNC: 8.8 MG/DL — SIGNIFICANT CHANGE UP (ref 8.4–10.5)
CHLORIDE BLDV-SCNC: 100 MMOL/L — SIGNIFICANT CHANGE UP (ref 96–108)
CHLORIDE SERPL-SCNC: 102 MMOL/L — SIGNIFICANT CHANGE UP (ref 96–108)
CO2 BLDV-SCNC: 28 MMOL/L — HIGH (ref 22–26)
CO2 SERPL-SCNC: 23 MMOL/L — SIGNIFICANT CHANGE UP (ref 22–31)
COLOR SPEC: SIGNIFICANT CHANGE UP
CREAT SERPL-MCNC: 0.92 MG/DL — SIGNIFICANT CHANGE UP (ref 0.5–1.3)
DIFF PNL FLD: NEGATIVE — SIGNIFICANT CHANGE UP
EGFR: 88 ML/MIN/1.73M2 — SIGNIFICANT CHANGE UP
EOSINOPHIL # BLD AUTO: 0.04 K/UL — SIGNIFICANT CHANGE UP (ref 0–0.5)
EOSINOPHIL NFR BLD AUTO: 0.7 % — SIGNIFICANT CHANGE UP (ref 0–6)
EPI CELLS # UR: 0 /HPF — SIGNIFICANT CHANGE UP
GAS PNL BLDV: 133 MMOL/L — LOW (ref 136–145)
GAS PNL BLDV: SIGNIFICANT CHANGE UP
GAS PNL BLDV: SIGNIFICANT CHANGE UP
GLUCOSE BLDV-MCNC: 105 MG/DL — HIGH (ref 70–99)
GLUCOSE SERPL-MCNC: 103 MG/DL — HIGH (ref 70–99)
GLUCOSE UR QL: NEGATIVE — SIGNIFICANT CHANGE UP
HCO3 BLDV-SCNC: 27 MMOL/L — SIGNIFICANT CHANGE UP (ref 22–29)
HCT VFR BLD CALC: 53 % — HIGH (ref 39–50)
HCT VFR BLDA CALC: 54 % — HIGH (ref 39–51)
HGB BLD CALC-MCNC: 17.9 G/DL — HIGH (ref 12.6–17.4)
HGB BLD-MCNC: 17.6 G/DL — HIGH (ref 13–17)
IMM GRANULOCYTES NFR BLD AUTO: 0.2 % — SIGNIFICANT CHANGE UP (ref 0–0.9)
INR BLD: 0.98 RATIO — SIGNIFICANT CHANGE UP (ref 0.85–1.18)
KETONES UR-MCNC: NEGATIVE — SIGNIFICANT CHANGE UP
LACTATE BLDV-MCNC: 1.2 MMOL/L — SIGNIFICANT CHANGE UP (ref 0.5–2)
LEUKOCYTE ESTERASE UR-ACNC: NEGATIVE — SIGNIFICANT CHANGE UP
LYMPHOCYTES # BLD AUTO: 1.04 K/UL — SIGNIFICANT CHANGE UP (ref 1–3.3)
LYMPHOCYTES # BLD AUTO: 19.3 % — SIGNIFICANT CHANGE UP (ref 13–44)
MCHC RBC-ENTMCNC: 31.4 PG — SIGNIFICANT CHANGE UP (ref 27–34)
MCHC RBC-ENTMCNC: 33.2 GM/DL — SIGNIFICANT CHANGE UP (ref 32–36)
MCV RBC AUTO: 94.5 FL — SIGNIFICANT CHANGE UP (ref 80–100)
MONOCYTES # BLD AUTO: 0.88 K/UL — SIGNIFICANT CHANGE UP (ref 0–0.9)
MONOCYTES NFR BLD AUTO: 16.3 % — HIGH (ref 2–14)
NEUTROPHILS # BLD AUTO: 3.38 K/UL — SIGNIFICANT CHANGE UP (ref 1.8–7.4)
NEUTROPHILS NFR BLD AUTO: 62.8 % — SIGNIFICANT CHANGE UP (ref 43–77)
NITRITE UR-MCNC: NEGATIVE — SIGNIFICANT CHANGE UP
NRBC # BLD: 0 /100 WBCS — SIGNIFICANT CHANGE UP (ref 0–0)
PCO2 BLDV: 44 MMHG — SIGNIFICANT CHANGE UP (ref 42–55)
PH BLDV: 7.39 — SIGNIFICANT CHANGE UP (ref 7.32–7.43)
PH UR: 6 — SIGNIFICANT CHANGE UP (ref 5–8)
PLATELET # BLD AUTO: 174 K/UL — SIGNIFICANT CHANGE UP (ref 150–400)
PO2 BLDV: 17 MMHG — LOW (ref 25–45)
POTASSIUM BLDV-SCNC: 4.3 MMOL/L — SIGNIFICANT CHANGE UP (ref 3.5–5.1)
POTASSIUM SERPL-MCNC: 4.3 MMOL/L — SIGNIFICANT CHANGE UP (ref 3.5–5.3)
POTASSIUM SERPL-SCNC: 4.3 MMOL/L — SIGNIFICANT CHANGE UP (ref 3.5–5.3)
PROT SERPL-MCNC: 6.8 G/DL — SIGNIFICANT CHANGE UP (ref 6–8.3)
PROT UR-MCNC: ABNORMAL
PROTHROM AB SERPL-ACNC: 10.8 SEC — SIGNIFICANT CHANGE UP (ref 9.5–13)
RAPID RVP RESULT: DETECTED
RBC # BLD: 5.61 M/UL — SIGNIFICANT CHANGE UP (ref 4.2–5.8)
RBC # FLD: 13.2 % — SIGNIFICANT CHANGE UP (ref 10.3–14.5)
RBC CASTS # UR COMP ASSIST: 1 /HPF — SIGNIFICANT CHANGE UP (ref 0–4)
SAO2 % BLDV: 20.9 % — LOW (ref 67–88)
SARS-COV-2 RNA SPEC QL NAA+PROBE: DETECTED
SODIUM SERPL-SCNC: 138 MMOL/L — SIGNIFICANT CHANGE UP (ref 135–145)
SP GR SPEC: >1.05 (ref 1.01–1.02)
TROPONIN T, HIGH SENSITIVITY RESULT: 12 NG/L — SIGNIFICANT CHANGE UP (ref 0–51)
UROBILINOGEN FLD QL: NEGATIVE — SIGNIFICANT CHANGE UP
WBC # BLD: 5.39 K/UL — SIGNIFICANT CHANGE UP (ref 3.8–10.5)
WBC # FLD AUTO: 5.39 K/UL — SIGNIFICANT CHANGE UP (ref 3.8–10.5)
WBC UR QL: 0 /HPF — SIGNIFICANT CHANGE UP (ref 0–5)

## 2023-07-31 PROCEDURE — 70498 CT ANGIOGRAPHY NECK: CPT | Mod: 26,MA

## 2023-07-31 PROCEDURE — 70450 CT HEAD/BRAIN W/O DYE: CPT | Mod: 26,MA,XU

## 2023-07-31 PROCEDURE — 71045 X-RAY EXAM CHEST 1 VIEW: CPT | Mod: 26

## 2023-07-31 PROCEDURE — 99223 1ST HOSP IP/OBS HIGH 75: CPT | Mod: FS

## 2023-07-31 PROCEDURE — 70496 CT ANGIOGRAPHY HEAD: CPT | Mod: 26,MA

## 2023-07-31 RX ORDER — CLOPIDOGREL BISULFATE 75 MG/1
75 TABLET, FILM COATED ORAL ONCE
Refills: 0 | Status: COMPLETED | OUTPATIENT
Start: 2023-07-31 | End: 2023-07-31

## 2023-07-31 RX ORDER — ASPIRIN/CALCIUM CARB/MAGNESIUM 324 MG
81 TABLET ORAL DAILY
Refills: 0 | Status: DISCONTINUED | OUTPATIENT
Start: 2023-07-31 | End: 2023-08-04

## 2023-07-31 RX ORDER — METOPROLOL TARTRATE 50 MG
25 TABLET ORAL
Refills: 0 | Status: DISCONTINUED | OUTPATIENT
Start: 2023-07-31 | End: 2023-08-04

## 2023-07-31 RX ORDER — ATORVASTATIN CALCIUM 80 MG/1
80 TABLET, FILM COATED ORAL AT BEDTIME
Refills: 0 | Status: DISCONTINUED | OUTPATIENT
Start: 2023-07-31 | End: 2023-08-04

## 2023-07-31 RX ORDER — FAMOTIDINE 10 MG/ML
40 INJECTION INTRAVENOUS DAILY
Refills: 0 | Status: DISCONTINUED | OUTPATIENT
Start: 2023-07-31 | End: 2023-08-04

## 2023-07-31 RX ORDER — ACETAMINOPHEN 500 MG
1000 TABLET ORAL ONCE
Refills: 0 | Status: COMPLETED | OUTPATIENT
Start: 2023-07-31 | End: 2023-07-31

## 2023-07-31 RX ADMIN — CLOPIDOGREL BISULFATE 75 MILLIGRAM(S): 75 TABLET, FILM COATED ORAL at 19:15

## 2023-07-31 RX ADMIN — ATORVASTATIN CALCIUM 80 MILLIGRAM(S): 80 TABLET, FILM COATED ORAL at 19:12

## 2023-07-31 RX ADMIN — FAMOTIDINE 40 MILLIGRAM(S): 10 INJECTION INTRAVENOUS at 19:10

## 2023-07-31 RX ADMIN — Medication 81 MILLIGRAM(S): at 19:11

## 2023-07-31 RX ADMIN — Medication 25 MILLIGRAM(S): at 19:10

## 2023-07-31 RX ADMIN — Medication 400 MILLIGRAM(S): at 18:00

## 2023-07-31 NOTE — ED CDU PROVIDER DISPOSITION NOTE - CARE PROVIDER_API CALL
Kush Schwartz  Neurology  [address] [facility] [address], [address]  [place] [address]  Phone: [phone]  Fax: [phone]  Follow Up Time: 1-3 Days

## 2023-07-31 NOTE — ED PROVIDER NOTE - PHYSICAL EXAMINATION
GENERAL: Awake, alert, Febrile  HEENT: NC/AT, moist mucous membranes, EOMI  LUNGS: CTAB, no wheezes or crackles   CARDIAC: RRR, no m/r/g  ABDOMEN: Soft, non tender, non distended, no rebound, no guarding  EXT: No edema, no calf tenderness, no deformities.  Motor strength 5 out of 5 throughout.  NEURO: A&Ox3. Moving all extremities.  Cranial nerves grossly intact.  SKIN: Warm and dry. No rash.  PSYCH: Normal affect.

## 2023-07-31 NOTE — ED CDU PROVIDER DISPOSITION NOTE - NSFOLLOWUPCLINICS_GEN_ALL_ED_FT
Cardiology at [facility]  Cardiology  [address] [place] [address]  Phone: [phone]  Fax:   Follow Up Time: 1-3 Days

## 2023-07-31 NOTE — ED CDU PROVIDER DISPOSITION NOTE - CLINICAL COURSE
72yo M with PMH of CAD/MI s/p stents, HLD, ?TIA at age 30, former smoker, presenting to ED as code stroke for confusion and speech disturbance. Woke up today feeling well, last seen with wife at 10:00 at home, then alone. Daughter called pt around 14:00 and noted that he had word finding difficulty. Then, wife called pt at 15:30 and noted confusion, hallucination?, pt reported he needs to be seen by "secret service". Pt himself remembers the episode, also reports vision changes earlier today "seeing spots" that has since resolved, and reports symptoms overall lasted several hours. Pt now back to baseline. Of note, reports cold-like sx for 2-3 days including low fever, cough, headache, nasal congestion. No sick contacts, but currently living with grandchildren. Reports generalized fatigue. Denies any neck stiffness, current vision changes, CP, SOB, abd pain, n/v/d, numbness/tingling/weakness throughout extremities, trouble walking, dizziness.   In ED, pt noted to be febrile to 100.4F, vitals otherwise stable. Labs unremarkable. RVP pending. CT/CTAs with no acute findings (+ chronic infarcts). Pt seen by neuro recommending monitoring in cdu, MRI brain, and echo.   In CDU 72yo M with PMH of CAD/MI s/p stents, HLD, ?TIA at age 30, former smoker, presenting to ED as code stroke for confusion and speech disturbance. Woke up today feeling well, last seen with wife at 10:00 at home, then alone. Daughter called pt around 14:00 and noted that he had word finding difficulty. Then, wife called pt at 15:30 and noted confusion, hallucination?, pt reported he needs to be seen by "secret service". Pt himself remembers the episode, also reports vision changes earlier today "seeing spots" that has since resolved, and reports symptoms overall lasted several hours. Pt now back to baseline. Of note, reports cold-like sx for 2-3 days including low fever, cough, headache, nasal congestion. No sick contacts, but currently living with grandchildren. Reports generalized fatigue. Denies any neck stiffness, current vision changes, CP, SOB, abd pain, n/v/d, numbness/tingling/weakness throughout extremities, trouble walking, dizziness.   In ED, pt noted to be febrile to 100.4F, vitals otherwise stable. Labs unremarkable. RVP pending. CT/CTAs with no acute findings (+ chronic infarcts). Pt seen by neuro recommending monitoring in cdu, MRI brain, and echo.   In CDU, MRI unremarkable. Pt cleared by neuro to f/u as outpatient.

## 2023-07-31 NOTE — ED PROVIDER NOTE - CLINICAL SUMMARY MEDICAL DECISION MAKING FREE TEXT BOX
73-year-old male patient who presents to the emergency department with concerns of a TIA and altered mental status today.  Witnessed over the phone by daughter and wife.  On exam, patient with no neurological deficits on exam.  Will eval with CTs and blood work.  Patient code stroke on arrival.

## 2023-07-31 NOTE — ED ADULT TRIAGE NOTE - CHIEF COMPLAINT QUOTE
difficulty word finding, not making sense while speaking with daughter- resolved now, headache  last known well 9am, on plavix

## 2023-07-31 NOTE — ED CDU PROVIDER DISPOSITION NOTE - NSFOLLOWUPINSTRUCTIONS_ED_ALL_ED_FT
Rest. Keep self well hydrated. Continue home medications as prescribed.   Start taking Aspirin 81mg daily and Atorvastatin 80mg daily.   Follow up with your primary care provider and Neurologist,  -- in 24-48 hours. Take copy of your printed results with you to your appointment.   Stroke education packet was given to you for further information.  Return to ER for any weakness/dizziness, numbness/tingling, change in speech or vision, problems walking or any other concerns. Rest. Keep self well hydrated.   Continue home medications as prescribed.     Continue taking Aspirin 81mg daily.    Follow up with your primary care provider and Neurologist, Dr. Schwartz in 24-48 hours.   Take copy of your printed results with you to your appointment.     Please follow up with cardiologist as outpatient to have Echocardiogram as discussed.     Be sure to wear mask, and wash hands/surfaces to prevent spread of virus.     Return to ER for any weakness, dizziness, numbness, tingling, change in speech or vision, problems walking, confusion, shortness of breath or trouble breathing, persistent high fevers, vomiting, unable to tolerate fluids, or any other concerns.    Cardiology at [facility]  Cardiology  [address] [place] [address]  Phone: [phone]    Kush Schwartz  Neurology  [address] [facility] [address], [address]  [place] [address]  Phone: [phone]

## 2023-07-31 NOTE — ED ADULT NURSE NOTE - NS_SISCREENINGSR_GEN_ALL_ED
Follow up  NOTE - NEPHROLOGY    Patient: Sasha Choi Date: 2022   : 1935 Attending: Laverne Cantu MD   86 year old female      CHIEF COMPLAINT    Acute Kidney injury / Hypernatremia     HISTORY OF PRESENT ILLNESS    Sasha is a 86 year old female with a past medical history significant for hypertension, chronic COPD who presented to the ED with complaint of generalized weakness and altered mental status.    Per patient's son, patient has developed moderate to severe generalized weakness over the past 2 weeks with lethargy, decreased appetite with decreased oral intake and increasing confusion. She is confused on high ann-marie and is unfortunately covid -19 positive. She is now in acute kidney injury. Her prior creatinine was 1.07 in 2018.       Patient was admitted for further work-up and management.    S; less shortness of breath more interactive but still confused RESTING COMFORTABLY    Medications/Infusions:  Scheduled:   • escitalopram  5 mg Oral Daily   • gabapentin  300 mg Oral 2 times per day   • atorvastatin  40 mg Oral Nightly   • hydrALAZINE  25 mg Oral Q12H   • piperacillin-tazobactam (ZOSYN) extended interval IVPB  3.375 g Intravenous 2 times per day   • heparin (porcine)  5,000 Units Subcutaneous 3 times per day   • dexamethasone  10 mg Intravenous Daily   • pantoprazole  40 mg Intravenous Q12H   • doxycycline  100 mg Intravenous 2 times per day   • ipratropium-albuterol  3 mL Nebulization 4x Daily Resp   • sodium chloride (PF)  2 mL Intracatheter 2 times per day   • Potassium Standard Replacement Protocol   Does not apply See Admin Instructions   • Magnesium Standard Replacement Protocol   Does not apply See Admin Instructions       Continuous Infusions:   • dextrose 10 % infusion 80 mL/hr at 22 0642   Home medications  Medications      amlodipine-benazepril 10 mg-20 mg oral capsule  1 cap(s), Oral, daily, # 90 cap(s), 1 Refill(s), Type: Maintenance, Pharmacy: [facility] #[id number], 1 cap(s) Oral daily, 64, in, 12/06/21 13:12:00 CST, Height Measured, 77, lb, 12/06/21 13:12:00 CST, Weight Measured  Start Date: 12/6/21  Status: Ordered    Breo Ellipta 100 mcg-25 mcg/inh inhalation powder  1 puff(s), Inhale, daily, # 3 EA, 1 Refill(s), Type: Maintenance, Pharmacy: [facility] #[id number], 1 puff(s) Inhale daily,x90 day(s), 64, in, 12/06/21 13:12:00 CST, Height Measured, 77, lb, 12/06/21 13:12:00 CST, Weight Measured  Start Date: 12/6/21  Stop Date: 6/4/22  Status: Ordered    gabapentin 300 mg oral capsule  = 1 cap(s) ( 300 mg ), Oral, qhs, # 90 cap(s), 0 Refill(s), Type: Maintenance, Pharmacy: [facility] #[id number], 1 cap(s) Oral qhs, 64, in, 12/06/21 13:12:00 CST, Height Measured, 77, lb, 12/06/21 13:12:00 CST, Weight Measured  Start Date: 12/6/21  Status: Ordered    hydroCHLOROthiazide 12.5 mg oral tablet  = 1 tab(s) ( 12.5 mg ), Oral, daily, # 90 tab(s), 1 Refill(s), Type: Maintenance, Pharmacy: [facility] #[id number], 1 tab(s) Oral daily,x90 day(s), 64, in, 12/06/21 13:12:00 CST, Height Measured, 77, lb, 12/06/21 13:12:00 CST, Weight Measured  Start Date: 12/6/21  Stop Date: 6/4/22  Status: Ordered    Lexapro 5 mg oral tablet  = 1 tab(s) ( 5 mg ), Oral, daily, # 90 tab(s), 0 Refill(s), Type: Maintenance, Pharmacy: [facility] #[id number], 1 tab(s) Oral daily, 64, in, 12/06/21 13:12:00 CST, Height Measured, 77, lb, 12/06/21 13:12:00 CST, Weight Measured  Start Date: 12/6/21  Status: Ordered    Potassium Chloride (Eqv-K-Tab) 10 mEq oral tablet, extended release  = 1 tab(s) ( 10 mEq ), Oral, daily, # 90 tab(s), 1 Refill(s), Type: Maintenance, Pharmacy: [facility] #[id number], 1 tab(s) Oral daily,x90 day(s), 64, in, 12/06/21 13:12:00 CST, Height Measured, 77, lb, 12/06/21 13:12:00 CST, Weight Measured  Start Date: 12/6/21  Stop Date: 6/4/22  Status: Ordered    ProAir HFA 90 mcg/inh inhalation aerosol  2 puff(s), Inhale, q6 hrs, # 3 EA, 1 Refill(s), Type:  Maintenance, Pharmacy: [facility] #[id number], 2 puff(s) Inhale q6 hrs, 64, in, 12/06/21 13:12:00 CST, Height Measured, 77, lb, 12/06/21 13:12:00 CST, Weight Measured  Start Date: 12/6/21  Status: Ordered    simvastatin 20 mg oral tablet  = 1 tab(s) ( 20 mg ), Oral, hs, # 90 tab(s), 1 Refill(s), Type: Maintenance, Pharmacy: [facility] #[id number], 1 tab(s) Oral hs, 64, in, 12/06/21 13:12:00 CST, Height Measured, 77, lb, 12/06/21 13:12:00 CST, Weight Measured  Start Date: 12/6/21  Status: Ordered        ALLERGIES  Allergies as of 01/10/2022   • (Not on File)        HISTORIES  Past Medical History:   Diagnosis Date   • Asthma    • COPD (chronic obstructive pulmonary disease) (CMS/[facility])    • Essential (primary) hypertension    • Pneumonia        History reviewed. No pertinent surgical history.    History reviewed. No pertinent family history. No known family history of ESRD    Social History     Socioeconomic History   • Marital status:      Spouse name: Not on file   • Number of children: Not on file   • Years of education: Not on file   • Highest education level: Not on file   Occupational History   • Not on file   Tobacco Use   • Smoking status: Never Smoker   • Smokeless tobacco: Current User   Vaping Use   • Vaping Use: Not on file   Substance and Sexual Activity   • Alcohol use: Not on file   • Drug use: Never   • Sexual activity: Not on file   Other Topics Concern   • Not on file   Social History Narrative   • Not on file     Social Determinants of Health     Financial Resource Strain:    • Social Determinants: Financial Resource Strain: Not on file   Food Insecurity:    • Social Determinants: Food Insecurity: Not on file   Transportation Needs:    • Lack of Transportation (Medical): Not on file   • Lack of Transportation (Non-Medical): Not on file   Physical Activity:    • Days of Exercise per Week: Not on file   • Minutes of Exercise per Session: Not on file   Stress:    • Social Determinants:  Stress: Not on file   Social Connections:    • Social Determinants: Social Connections: Not on file   Intimate Partner Violence:    • Social Determinants: Intimate Partner Violence Past Fear: Not on file   • Social Determinants: Intimate Partner Violence Current Fear: Not on file      Review of Systems  Review of Systems   Unable to perform ROS: Mental status change        Vital Last Value 24 Hour Range   Temperature 97.9 °F (36.6 °C) (01/13/22 1250) Temp  Min: 97.3 °F (36.3 °C)  Max: 97.9 °F (36.6 °C)   Pulse 70 (01/13/22 1250) Pulse  Min: 51  Max: 74   Respiratory 16 (01/13/22 0936) Resp  Min: 16  Max: 18   Non-Invasive  Blood Pressure 138/68 (01/13/22 1250) BP  Min: 131/74  Max: 159/80   Arterial   Blood Pressure   No data recorded   Pulse Oximetry 95 % (01/13/22 1250) SpO2  Min: 80 %  Max: 100 %     Vital Today Admitted   Weight 49.9 kg (110 lb) (01/12/22 0334) Weight: 49.9 kg (110 lb) (01/12/22 0334)   Height N/A     BMI N/A       Weight over the past 48 Hours:  Patient Vitals for the past 48 hrs:   Weight   01/12/22 0334 49.9 kg (110 lb)        Intake/Output:    Last Stool Occurrence:      No intake/output data recorded.    I/O last 3 completed shifts:  In: 960 [I.V.:960]  Out: -       Intake/Output Summary (Last 24 hours) at 1/13/2022 1500  Last data filed at 1/13/2022 0659  Gross per 24 hour   Intake 960 ml   Output --   Net 960 ml       Physical Exam:    Last Recorded Vitals  Last Recorded Vitals:  Visit Vitals  /68 (BP Location: LUE - Left upper extremity, Patient Position: Semi-Dodd's)   Pulse 70   Temp 97.9 °F (36.6 °C) (Axillary)   Resp 16   Wt 49.9 kg (110 lb)   SpO2 95%       Intake/Output Summary (Last 24 hours) at 1/13/2022 1500  Last data filed at 1/13/2022 0659  Gross per 24 hour   Intake 960 ml   Output --   Net 960 ml           Physical Exam  Constitutional:       General: She is  In no distress     Appearance: She is ill-appearing. She is not toxic-appearing or diaphoretic.   HENT:       Head: Normocephalic and atraumatic.      Mouth: Mucous membranes are moist.      Pharynx: Oropharynx is clear. No oropharyngeal exudate or posterior oropharyngeal erythema.   Eyes:      General: No scleral icterus.     Extraocular Movements: Extraocular movements intact.      Conjunctiva/sclera: Conjunctivae normal.      Pupils: Pupils are equal, round, and reactive to light.   Cardiovascular:      Rate and Rhythm: Normal rate and regular rhythm.    no edema       Heart sounds: Normal heart sounds. No murmur heard.  No friction rub. No gallop.    Pulmonary:      Effort normal effort with present.      Breath sounds: Normal breath sounds. No stridor. No wheezing, rhonchi or rales.      Comments: Currently on high ann-marie   Chest:      Chest wall: No tenderness.   Abdominal:      General: Abdomen is flat. Bowel sounds are normal. There is no distension.      Palpations: Abdomen is soft. There is no mass.      Tenderness: There is no abdominal tenderness. There is no right CVA tenderness, left CVA tenderness, guarding or rebound.      Hernia: No hernia is present.   Musculoskeletal:         General: No swelling, tenderness, deformity or signs of injury.      Right lower leg: No edema.      Left lower leg: No edema.   Lymphadenopathy:      Cervical: No cervical adenopathy.   Skin:     General: Skin is warm and dry.      Capillary Refill: Capillary refill takes less than 2 seconds.      Coloration: Skin is not jaundiced or pale.      Findings: No bruising, erythema, lesion or rash.   Neurological:      Mental Status: She is awake and confused. Generalized weakness   Psychiatric:      Comments: Deferred due to clinical presentation.       Laboratory Results:  Recent Labs     01/11/22  0630 01/12/22  0713   SODIUM 149* 146*   POTASSIUM 4.3 3.3*   CHLORIDE 112* 110*   CO2 27 25   ANIONGAP 14 14   GLUCOSE 185* 191*   BUN 79* 76*   CREATININE 2.34* 2.06*   BCRAT 34* 37*   CALCIUM 8.5 8.4   PHOS 6.1* 3.4   MG 2.6* 2.4   BILIRUBIN  0.4  --    AST 68*  --    GPT 27  --    ALKPT 85  --    TOTPROTEIN 6.3*  --    ALBUMIN 2.4*  --    GLOB 3.9  --    AGR 0.6*  --         Recent Labs     01/10/22  1839 01/11/22  0630 01/12/22  0713   WBC 10.4 13.6* 16.2*   RBC 4.49 4.65 4.38   HGB 12.9 13.3 12.5   HCT 42.5 44.1 40.9    302 281   MCV 94.7 94.8 93.4   MCH 28.7 28.6 28.5   MCHC 30.4* 30.2* 30.6*   NRBCRE 0 0 0   ASEG  --  9.2*  --    ALYMP  --  1.2  --    AMONO  --  3.1*  --    AEO  --  0.0  --    ABAS  --  0.0  --    PMOR  --  Normal Normal            Lab Results   Component Value Date    SODIUM 146 (H) 01/12/2022    POTASSIUM 3.3 (L) 01/12/2022    CHLORIDE 110 (H) 01/12/2022    CO2 25 01/12/2022    ANIONGAP 14 01/12/2022    BUN 76 (H) 01/12/2022    CREATININE 2.06 (H) 01/12/2022    CALCIUM 8.4 01/12/2022    GLUCOSE 191 (H) 01/12/2022     (H) 01/11/2022     01/10/2022    WBC 16.2 (H) 01/12/2022    HCT 40.9 01/12/2022     Lab Results   Component Value Date    HGB 12.5 01/12/2022     01/12/2022    INR 1.0 01/10/2022    PTT 27 01/10/2022    MG 2.4 01/12/2022    PHOS 3.4 01/12/2022    ALKPT 85 01/11/2022    BILIRUBIN 0.4 01/11/2022    CRP 8.5 (H) 01/12/2022    AST 68 (H) 01/11/2022    GPT 27 01/11/2022    ALBUMIN 2.4 (L) 01/11/2022    LACTA 1.1 01/10/2022    FERR 636 (H) 01/12/2022       Urine Panel  Lab Results   Component Value Date    UCL 56 01/10/2022    JANNETH 32 01/10/2022    UKET Negative 01/10/2022    USPG 1.020 01/10/2022    UPROT Negative 01/10/2022    UWBC Negative 01/10/2022    URBC Small (A) 01/10/2022    UBILI Negative 01/10/2022    UPH 5.5 01/10/2022    UTPELC 130 (H) 01/10/2022   Results for PRISCA YUEN (MRN [id number]) as of 1/11/2022 10:51   Ref. Range 1/10/2022 15:50 1/10/2022 19:00   CONDITION - RESPIRATORY Unknown ;2LPM NC    PH, ARTERIAL - RESPIRATORY Latest Ref Range: 7.35 - 7.45 Units 7.26 (L) 7.25 (L)   PCO2, ARTERIAL - RESPIRATORY Latest Ref Range: 32 - 45 mm Hg 78 (HH) 77 (HH)   PO2, ARTERIAL -  RESPIRATORY Latest Ref Range: 83 - 108 mm Hg 63 (L) 48 (LL)   HCO3, ARTERIAL - RESPIRATORY Latest Ref Range: 22 - 28 mmol/L 35 (H) 34 (H)   BASE EXCESS / DEFICIT, ARTERIAL - RESPIRATORY Latest Ref Range: -2 - 3 mmol/L 5 (H) 4 (H)   O2 CONTENT, ARTERIAL - RESPIRATORY Latest Ref Range: 15 - 23 % 23 14 (L)   O2 SATURATION, ARTERIAL - RESPIRATORY Latest Ref Range: 95 - 99 % 91 (L) 77 (L)   OXYHEMOGLOBIN, ARTERIAL - RESPIRATORY Latest Ref Range: 94.0 - 98.0 % 87.8 (L) 75.2 (L)   CARBOXYHEMOGLOBIN - RESPIRATORY Latest Ref Range: 1.5 - 15.0 % 2.4 1.7   METHEMOGLOBIN - RESPIRATORY Latest Ref Range: <=1.6 % 0.7 0.6   SITE - RESPIRATORY Unknown Right Femoral    TEMPERATURE - RESPIRATORY Latest Units: degrees 37.0 37.0   Results for PRISCA YUEN (MRN [id number]) as of 1/12/2022 17:21   Ref. Range 1/10/2022 15:23 1/10/2022 21:23   COLOR Unknown Yellow Yellow   CLARITY Unknown Clear Clear   GLUCOSE(URINE) Latest Ref Range: Negative mg/dL Negative Negative   KETONES Latest Ref Range: Negative mg/dL Negative Negative   PROTEIN(URINE) Latest Ref Range: Negative mg/dL 30 (A) Negative   BLOOD Latest Ref Range: Negative  Trace (A) Small (A)   NITRITE Latest Ref Range: Negative  Negative Negative   LEUKOCYTE ESTERASE Latest Ref Range: Negative  Negative Negative   BILIRUBIN Latest Ref Range: Negative  Negative Negative   UROBILINOGEN Latest Ref Range: 0.2, 1.0 mg/dL 0.2 0.2   pH Latest Ref Range: 5.0 - 7.0  5.5 5.5   SPECIFIC GRAVITY Latest Ref Range: 1.005 - 1.030  >=1.030 1.020   ERYTHROCYTES, URINALYSIS Latest Ref Range: None Seen, 1 to 2 /hpf 1 to 2 3 to 5 (A)   LEUKOCYTES, URINALYSIS Latest Ref Range: None Seen, 1 to 5 /hpf 1 to 5 1 to 5   BACTERIA, URINALYSIS Latest Ref Range: None Seen /hpf Few (A) None Seen   HYALINE CASTS, URINALYSIS Latest Ref Range: None Seen, 1 to 5 /lpf 1 to 5 1 to 5   Squamous EPI'S Latest Ref Range: None Seen, 1 to 5 /hpf 11 to 25 (A) 6 to 10 (A)   CHLORIDE, URINE (TOTAL) Latest Units: mmol/L 56     CREATININE, URINE (TOTAL) Latest Units: mg/dL 305.61    PROTEIN, URINE (TOTAL) Latest Ref Range: <12 mg/dL 130 (H)    SODIUM, URINE (TOTAL) Latest Units: mmol/L 32        Imaging  I have reviewed the pertinent imaging study reports. These are the pertinent findings: CT CHEST WO CONTRAST  Narrative: EXAM: CT CHEST WO CONTRAST    CLINICAL INDICATION: Dyspnea, chronic, unclear etiology    COMPARISON:  CT abdomen and pelvis dated 01/10/2022 and nuclear medicine  perfusion imaging dated 01/10/2022 reviewed.     TECHNIQUE: CT chest without contrast, with multiplanar reformats. mA and/or  kVp was adjusted for patient size.    FINDINGS:   Suboptimal patient positioning.    Limited noncontrast exam.     Neck base is grossly unremarkable.    No axillary mediastinal or hilar lymphadenopathy.    Normal cardiac size.  Severe coronary artery disease.  Severe  atherosclerotic disease.    Moderate emphysematous changes, with an apical predominance.  Few scattered  areas of cystic bronchiectasis.  Small left pleural effusion.  Mild amount  of streaky left lower lobe opacities, new from 01/10/2022 exam.    Other findings:  No contour deforming hepatic lesions.  Scattered calcifications of the spleen, likely reflects sequela of prior  granulomatous disease.  Vertebral plana vertebra of T8.  Diffuse bony demineralization.  Mild to  moderate vertebral body height loss of T12.  Impression: 1.   Limited exam secondary to patient positioning, and lack of IV  contrast.  2.   New streaky left lower lobe opacities.  Differentials: pneumonia,  aspiration and/or atelectasis.    3.   Moderate emphysema, with a few scattered areas of cystic  bronchiectasis.  4.   Left pleural effusion, small.  5.   T8 vertebral plana, and T12 mild to moderate vertebral body height  loss, concerning for compression fractures (technically age indeterminate  without prior comparisons, however appears to be chronic).  Cross-sectional  imaging of the thoracic spine  can be obtained if clinically warranted.  6.   Diffuse bony demineralization, recommend DEXA when feasible.  7.   Coronary artery disease, severe.  8.   Atherosclerotic disease, severe.    Electronically Signed by: CAROLYN PICKETT D.O.   Signed on: 1/12/2022 1:21 PM      CT CHEST WO CONTRAST    Result Date: 1/12/2022  EXAM: CT CHEST WO CONTRAST CLINICAL INDICATION: Dyspnea, chronic, unclear etiology COMPARISON:  CT abdomen and pelvis dated 01/10/2022 and nuclear medicine perfusion imaging dated 01/10/2022 reviewed. TECHNIQUE: CT chest without contrast, with multiplanar reformats. mA and/or kVp was adjusted for patient size. FINDINGS: Suboptimal patient positioning. Limited noncontrast exam.  Neck base is grossly unremarkable. No axillary mediastinal or hilar lymphadenopathy. Normal cardiac size.  Severe coronary artery disease.  Severe atherosclerotic disease. Moderate emphysematous changes, with an apical predominance.  Few scattered areas of cystic bronchiectasis.  Small left pleural effusion.  Mild amount of streaky left lower lobe opacities, new from 01/10/2022 exam. Other findings: No contour deforming hepatic lesions. Scattered calcifications of the spleen, likely reflects sequela of prior granulomatous disease. Vertebral plana vertebra of T8.  Diffuse bony demineralization.  Mild to moderate vertebral body height loss of T12.     1.   Limited exam secondary to patient positioning, and lack of IV contrast. 2.   New streaky left lower lobe opacities.  Differentials: pneumonia, aspiration and/or atelectasis.  3.   Moderate emphysema, with a few scattered areas of cystic bronchiectasis. 4.   Left pleural effusion, small. 5.   T8 vertebral plana, and T12 mild to moderate vertebral body height loss, concerning for compression fractures (technically age indeterminate without prior comparisons, however appears to be chronic).  Cross-sectional imaging of the thoracic spine can be obtained if clinically warranted. 6.    Diffuse bony demineralization, recommend DEXA when feasible. 7.   Coronary artery disease, severe. 8.   Atherosclerotic disease, severe. Electronically Signed by: CAROLYN PICKETT D.O. Signed on: 1/12/2022 1:21 PM         ASSESSMENT/PLAN:   Assessment & Plan    Patient is an 86 year old with a past medical history significant for hypertension, chronic COPD who presented to the ED with complaint of generalized weakness and altered mental status now with Covid-19 infection with hypoxia in shock     Severe sepsis with septic shock POA due to severe COVID-19 pneumonia  Acute respiratory failure with hypoxia and hypercapnia due to severe COVID-19 pneumonia on high ann-marie per ID and Critical care team   Acute on chronic COPD exacerbation      Non oliguric acute kidney injury multifactorial with shock / hypotension/ace/diuretic covid-19  Effect -improving   -continue volume resuscitation judiously with covid-19   -serologies  - urine studies FENA < 1 %  -dose all medications for decreased GFR  -avoid nephrotoxic agents if at all possible   -monitor renal function   -Renal ultrasound reviewed   .  Hypernatremia- continue hypotonic volume resuscitation      Type II MI per cardiology     Pulmonary embolism per PMD     Acute GI bleeding- per GI team      Chronic hypertension medication on hold with shock    Code status ?? Palliative care team      Severe protein calorie malnutrition  Nutritionist consulted.  Follow-up additional recommendations.    Hypokalemia replete      Plan       -continue volume resuscitation judiously with covid-19  -dose all medications for decreased GFR  -avoid nephrotoxic agents if at all possible   -monitor renal function   -Renal ultrasound reviewed     Critical medicine time spent on this patient was approximately 32  minutes. This is a time exclusive of procedures in time, teaching time spent includes physical exam reviewing all the medical records. Hemodynamic status. Laboratory studies, imaging  studies discussed with other providers and nursing team in addition to formulate the plan of this patient is critically ill. Overall prognosis is grave.Thank you for your consultation. Please feel free to call with questions.    Thank you very much for this referral.  We will follow with you.  Please feel free to call with any questions.        Cristel Sanchez MD   [facility] [place]  Contact via [employer] [facility]  Office/Answering service [phone]/ [phone]  Fax [phone]         Negative

## 2023-07-31 NOTE — ED PROVIDER NOTE - NSICDXPASTSURGICALHX_GEN_ALL_CORE_FT
PAST SURGICAL HISTORY:  S/P Tonsillectomy     Status Post Coronary Artery Stent Placement 2 stents 2010

## 2023-07-31 NOTE — ED CDU PROVIDER INITIAL DAY NOTE - ATTENDING APP SHARED VISIT CONTRIBUTION OF CARE
Barbara- I have personally performed a face to face diagnostic evaluation on this patient.  I have reviewed the ACP note and agree with the history, exam, and plan of care, except as noted. My exam and MDM are as follows:    73M with PMHx HLD, CAD with stents on ASA/Plavix presenting with episode of altered mental status today, symptoms since resolved.  Also reporting few days of headaches and fevers of 100.4 at home. Nonfocal exam including A&Ox3, CN2-12 intact. 5/5 strength and intact sensation x4 extremities. Normal finger-nose testing. Steady independent gait. Negative Romberg. Negative pronator drift. Activated as code stroke, negative stroke imaging.  ED work-up noted to be positive for COVID.  Neurology team recommending CDU for MRI.

## 2023-07-31 NOTE — ED PROVIDER NOTE - OBJECTIVE STATEMENT
73-year-old male patient past medical history of CAD status post stents, MI in 2008 and a who presents to the emergency department 73-year-old male patient past medical history of CAD status post stents, MI in 2008 and a who presents to the emergency department For an episode of altered mental status around 1 PM.  Was witnessed by daughter over the phone when he began speaking about the [employer] and at the time the daughter notes patient was confused.  The wife had called him later in the day as well and he had an episode of difficulty with word finding.  Last well-known was 10 AM when patient's wife last saw him at home.  Patient also endorsing fevers, URI symptoms since 2 days ago.

## 2023-07-31 NOTE — CONSULT NOTE ADULT - ASSESSMENT
73 y.o. M with PMH CAD s/p stents x2, MI  in 2008, ?TIA presented to [facility] ED on 7/31/23 at 16:09 as a code stroke for speech disturbance. LKN 13:55 hr on 7/31/23. Had ~1.5 hrs of possible confusion and word finding difficulty. Resolved by the time of presentation. Neuro exam at this time non-focal. CTH with motion artifact but grossly neg for acute hemorrhage or large ischemic infarct. CTA without LVO.    Not a tenecteplase candidate given mild symptoms  Not a candidate for thrombectomy given no LVO on CTA    Impression: Transient, resolved confusion and word finding difficulty of unclear etiology. Localization could be diffuse brain dysfunction vs L hemispheric dysfunction. Ddx include TIA vs r/o infectious vs r/o toxic metabolic vs lower suspicion for ischemic stroke at this time    Recommendations:  [] CDU evaluation  [] MRI brain w/o  [] C/w home Aspirin 81 and Plavix 75 mg   [] C/w home Atorvastatin 80, titrate to LDL<70  [] MRI brain w/o   [] TTE  [] +/- ILR   [] HbA1C and Lipid Panel  [] Telemonitoring;  Neurochecks and Vital signs q4  [] Permissive HTN up to 220/120 mmHg for first 24 hours after the symptom onset followed by gradual normotension.   [] BGM goals 140-180  [] NPO until clears Dysphagia screen, otherwise Swallow evaluation  [] Stroke education provided  [] Patient should follow up with Stroke NP, Yesenia Brambila, in clinic at [address] [place] [address]. Please email [email or web address] w/ basic PHI.     Case discussed with stroke fellow Dr. Todd under supervision of stroke attending Dr. Montana  Case to be seen with stroke attending if remains in hospital 73 y.o. M with PMH CAD s/p stents x2, MI  in 2008, ?TIA presented to [facility] ED on 7/31/23 at 16:09 as a code stroke for speech disturbance. LKN 13:55 hr on 7/31/23. Had ~1.5 hrs of possible confusion and word finding difficulty. Resolved by the time of presentation. Neuro exam at this time non-focal. CTH with motion artifact but grossly neg for acute hemorrhage or large ischemic infarct. CTA without LVO.    Not a tenecteplase candidate given mild symptoms  Not a candidate for thrombectomy given no LVO on CTA    Impression: Transient, resolved confusion and word finding difficulty of unclear etiology. Localization could be diffuse brain dysfunction vs L hemispheric dysfunction. Ddx include TIA vs r/o infectious vs r/o toxic metabolic vs lower suspicion for ischemic stroke at this time    Recommendations:  [] CDU evaluation  [] MRI brain w/o  [] TTE  [] +/- ILR   [] C/w home Aspirin 81 and Plavix 75 mg   [] C/w home Atorvastatin 80, titrate to LDL<70  [] HbA1C and Lipid Panel  [] Telemonitoring;  Neurochecks and Vital signs q4  [] Permissive HTN up to 220/120 mmHg for first 24 hours after the symptom onset followed by gradual normotension.   [] BGM goals 140-180  [] NPO until clears Dysphagia screen, otherwise Swallow evaluation  [] Stroke education provided  [] Patient should follow up with Stroke NP, Yesenia Brambila, in clinic at [address] [place] [address]. Please email [email or web address] w/ basic PHI.     Case discussed with stroke fellow Dr. Todd under supervision of stroke attending Dr. Montana  Case to be seen with stroke attending if remains in hospital

## 2023-07-31 NOTE — ED CDU PROVIDER INITIAL DAY NOTE - CPE EDP EYES NORM
----- Message from JESSICA Gonzalez sent at 2/15/2018 10:59 AM EST -----  Can you request last OV CA screening form [facility]?  
Spoke with Quyen at the  [facility]. She had this done on 5/6/17. She will fax this report to our office.   
normal...

## 2023-07-31 NOTE — CONSULT NOTE ADULT - SUBJECTIVE AND OBJECTIVE BOX
HPI:    LKN 13:55 hr on 7/31/23. Takes aspirin 81 mg qd and plavix 75 mg qd    (Stroke only)  NIHSS: 0  MRS: 0    REVIEW OF SYSTEMS  A 10-system ROS was performed and is negative except for those items noted above and/or in the HPI.    PAST MEDICAL & SURGICAL HISTORY:  MI (Myocardial Infarction)      Hypercholesteremia      Status Post Coronary Artery Stent Placement  2 stents 2010      S/P Tonsillectomy        FAMILY HISTORY:    SOCIAL HISTORY:   T/E/D:   Occupation:   Lives with:     MEDICATIONS (HOME):  Home Medications:  aspirin 325 mg oral tablet: 1 tab(s) orally once a day (16 Jul 2019 12:50)  atorvastatin 80 mg oral tablet: 1 tab(s) orally once a day (16 Jul 2019 12:50)  famotidine 40 mg oral tablet: 1 tab(s) orally once a day (at bedtime) (16 Jul 2019 12:50)  metoprolol succinate 25 mg oral tablet, extended release: 1 tab(s) orally once a day (16 Jul 2019 12:50)  Plavix 75 mg oral tablet: 1 tab(s) orally once a day (16 Jul 2019 12:50)    MEDICATIONS  (STANDING):    MEDICATIONS  (PRN):    ALLERGIES/INTOLERANCES:  Allergies  No Known Allergies    Intolerances    VITALS & EXAMINATION:  Vital Signs Last 24 Hrs  T(C): 37.7 (31 Jul 2023 15:59), Max: 37.7 (31 Jul 2023 15:59)  T(F): 99.9 (31 Jul 2023 15:59), Max: 99.9 (31 Jul 2023 15:59)  HR: 80 (31 Jul 2023 15:59) (80 - 80)  BP: 142/95 (31 Jul 2023 15:59) (142/95 - 142/95)  BP(mean): --  RR: 20 (31 Jul 2023 15:59) (20 - 20)  SpO2: 96% (31 Jul 2023 15:59) (96% - 96%)    Parameters below as of 31 Jul 2023 15:59  Patient On (Oxygen Delivery Method): room air        General:  Constitutional: Obese Male, appears stated age, in no apparent distress including pain  Head: Normocephalic & atraumatic.  ENT: Patent ear canals, intact TM, mucus membranes moist & pink, neck supple, no lymphadenopathy.   Respiratory: Patent airway. All lung fields are clear to auscultation bilaterally.  Extremities: No cyanosis, clubbing, or edema.  Skin: No rashes, bruising, or discoloration.    Cardiovascular (>2): RRR no murmurs. Carotid pulsations symmetric, no bruits. Normal capillary beds refill, 1-2 seconds or less.     Neurological (>12):  MS: Awake, alert, oriented to person, place, situation, time. Normal affect. Follows all commands.    Language: Speech is clear, fluent with good repetition & comprehension (able to name objects___)    CNs: PERRLA (R = 3mm, L = 3mm). VFF. EOMI no nystagmus, no diplopia. V1-3 intact to LT/pinprick, well developed masseter muscles b/l. No facial asymmetry b/l, full eye closure strength b/l. Hearing grossly normal (rubbing fingers) b/l. Symmetric palate elevation in midline. Gag reflex deferred. Head turning & shoulder shrug intact b/l. Tongue midline, normal movements, no atrophy.    Fundoscopic: pale w/ sharp discs margins No vascular changes.      Motor: Normal muscle bulk & tone. No noticeable tremor or seizure. No pronator drift.              Deltoid	Biceps	Triceps	Wrist	Finger ABd	   R	5	5	5	5	5		5 	  L	5	5	5	5	5		5    	H-Flex	H-Ext	H-ABd	H-ADd	K-Flex	K-Ext	D-Flex	P-Flex  R	5	5	5	5	5	5	5	5 	   L	5	5	5	5	5	5	5	5	     Sensation: Intact to LT/PP/Temp/Vibration/Position b/l throughout.     Cortical: Extinction on DSS (neglect): none    Reflexes:              Biceps(C5)       BR(C6)     Triceps(C7)               Patellar(L4)    Achilles(S1)    Plantar Resp  R	2	          2	             2		        2		    2		Down   L	2	          2	             2		        2		    2		Down     Coordination: intact rapid-alt movements. No dysmetria to FTN/HTS    Gait: Normal Romberg. No postural instability. Normal stance and tandem gait.     LABORATORY:  CBC   Chem       LFTs   Coagulopathy   Lipid Panel   A1c   Cardiac enzymes     U/A   CSF  Immunological  Other    STUDIES & IMAGING:  Studies (EKG, EEG, EMG, etc):     Radiology (XR, CT, MR, U/S, TTE/SUJEY): HPI:  73 y.o. M with PMH CAD s/p stents x2, MI  in 2008, ?TIA presented to [facility] ED on 7/31/23 at 16:09 as a code stroke for speech disturbance. LKN 13:55 hr on 7/31/23. Pt woke up at 06:00 hr on 7/31 at his usual state of health. Last seen with wife at 10:00 hr at home. Then, pt was not with anyone. Daughter called him around 14:00 hr on 7/31 and noted that he was not able to get the words out. Then, wife called him at 15:30 hr on 7/31 and was able to talk but was confused. Mentioned that he needs to be seen by "secret service". The latter part pt does not remember. He had on episode of inability to get words out for 30 minutes when he was 30 years old when wife was giving birth to 3rd child. Pt himself remembers the episode. Went to cardiologist, was given diagnosis of TIA at that time. Otherwise, for the past 2 days or so, had holocephalic superior b/l headache that was noted to be achy associated with cough, runny nose, and sneezing. No sick contacts, but lot of family members are visiting him. Denies any chest pain, abd pain, numbness/tingling/weakness throughout extremities. No neck pain or neck stiffness. Lives at home with wife. Independent with ADLs. Takes Lipitor 80 mg qd,  aspirin 81 mg qd and plavix 75 mg qd    (Stroke only)  NIHSS: 0  MRS: 0    REVIEW OF SYSTEMS  A 10-system ROS was performed and is negative except for those items noted above and/or in the HPI.    PAST MEDICAL & SURGICAL HISTORY:  MI (Myocardial Infarction)      Hypercholesteremia      Status Post Coronary Artery Stent Placement  2 stents 2010      S/P Tonsillectomy        FAMILY HISTORY:    SOCIAL HISTORY: as noted in HPI    MEDICATIONS (HOME):  Home Medications:  aspirin 325 mg oral tablet: 1 tab(s) orally once a day (16 Jul 2019 12:50)  atorvastatin 80 mg oral tablet: 1 tab(s) orally once a day (16 Jul 2019 12:50)  famotidine 40 mg oral tablet: 1 tab(s) orally once a day (at bedtime) (16 Jul 2019 12:50)  metoprolol succinate 25 mg oral tablet, extended release: 1 tab(s) orally once a day (16 Jul 2019 12:50)  Plavix 75 mg oral tablet: 1 tab(s) orally once a day (16 Jul 2019 12:50)    MEDICATIONS  (STANDING):    MEDICATIONS  (PRN):    ALLERGIES/INTOLERANCES:  Allergies  No Known Allergies    Intolerances    VITALS & EXAMINATION:  Vital Signs Last 24 Hrs  T(C): 37.7 (31 Jul 2023 15:59), Max: 37.7 (31 Jul 2023 15:59)  T(F): 99.9 (31 Jul 2023 15:59), Max: 99.9 (31 Jul 2023 15:59)  HR: 80 (31 Jul 2023 15:59) (80 - 80)  BP: 142/95 (31 Jul 2023 15:59) (142/95 - 142/95)  BP(mean): --  RR: 20 (31 Jul 2023 15:59) (20 - 20)  SpO2: 96% (31 Jul 2023 15:59) (96% - 96%)    Parameters below as of 31 Jul 2023 15:59  Patient On (Oxygen Delivery Method): room air        General:  Constitutional: overweight Male, appears stated age, in no apparent distress including pain  Head: Normocephalic & atraumatic.    Neurological (>12):  MS: Eyes open. Responds to verbal stimuli. Maintains eye opening. Awake, alert, oriented to person, place, situation, time. Normal affect. Follows all commands.    Language: Speech is clear, fluent with good repetition & comprehension (able to name objects nail, glove, thumb)    CNs: VFF. EOMI no nystagmus, no diplopia. V1-3 intact to LT, well developed masseter muscles b/l. No facial asymmetry b/l, full eye closure strength b/l. Hearing grossly normal (rubbing fingers) b/l.  Head turning & shoulder shrug intact b/l. Tongue midline, normal movements, no atrophy.    Motor: Normal muscle bulk & tone. No noticeable tremor or seizure. No pronator drift.              Deltoid	Biceps	Triceps	   R	5	5	5	5		  L	5	5	5	5	    	H-Flex  R	5		   L	5	     Sensation: Intact to LT b/l throughout.     Cortical: Extinction on DSS (neglect): none    Reflexes:              Biceps(C5)       BR(C6)     Triceps(C7)               Patellar(L4)     R	2	          2	             2		        2		     L	2	          2	             2		        2		     Coordination: intact rapid-alt movements. No dysmetria to FTN    Gait: No postural instability. Normal stance and tandem gait.     LABORATORY:  CBC   Chem       LFTs   Coagulopathy   Lipid Panel   A1c   Cardiac enzymes     U/A   CSF  Immunological  Other    STUDIES & IMAGING:  Studies (EKG, EEG, EMG, etc):     Radiology (XR, CT, MR, U/S, TTE/SUJEY):  < from: CT Angio Neck Stroke Protocol w/ IV Cont (07.31.23 @ 16:35) >  IMPRESSION::    Brain CT: Mildly motion limited study. No acute hemorrhage, mass effect   or extra-axial collections.    Neck CTA: No hemodynamically significant stenosis.    Brain CTA: No large vessel occlusion or high-grade stenosis    The results of the brain CT were discussed with Dr. Angeles Cordero at 4:27 PM   on 7/31/2023

## 2023-07-31 NOTE — ED CDU PROVIDER INITIAL DAY NOTE - OBJECTIVE STATEMENT
74yo M with PMH of CAD/MI s/p stents, HLD, ?TIA at age 30, former smoker, presenting to ED as code stroke for confusion and speech disturbance. Woke up today feeling well, last seen with wife at 10:00 at home, then alone. Daughter called pt around 14:00 and noted that he had word finding difficulty. Then, wife called pt at 15:30 and noted confusion, hallucination?, pt reported he needs to be seen by "secret service". Pt also reports vision changes earlier today "seeing spots" that has since resolved, and reports symptoms overall lasted several hours. Pt now back to baseline. Of note, reports cold-like sx for 2-3 days including low fever, cough, headache, nasal congestion. No sick contacts, but currently living with grandchildren. Reports generalized fatigue. Denies any neck stiffness, current vision changes, CP, SOB, abd pain, n/v/d, numbness/tingling/weakness throughout extremities, trouble walking, dizziness.   In ED, pt noted to be febrile to 100.4F, vitals otherwise stable. Labs unremarkable. RVP pending. CT/CTAs with no acute findings (+ chronic infarcts). Pt seen by neuro recommending monitoring in cdu, MRI brain, and echo.

## 2023-07-31 NOTE — ED CDU PROVIDER DISPOSITION NOTE - PATIENT PORTAL LINK FT
You can access the FollowMyHealth Patient Portal offered by [facility] by registering at the following website: [email or web address]. By joining [facility]’s FollowMyHealth portal, you will also be able to view your health information using other applications (apps) compatible with our system.

## 2023-07-31 NOTE — CONSULT NOTE ADULT - ATTENDING COMMENTS
I saw and examine the patient during Stroke rounds.    Patient's wife was at the bedside providing collateral history. The patient had been feeling unwell for several days with malaise and myalgias. Yesterday he went to visit his family and when driving home, he felt difficulty controlling the vehicle. When he got home, his wife phoned him and noted that his speech was bizarre. He was speaking about things pertaining to [employer] Service and national security. She returned home arriving within about 15 minutes. He seems slightly better at that time, but had not returned to his normal baseline, still struggling with words. She brought him to the emergency department.  Upon arrival he had returned to his baseline. In total he had about 30 minutes of symptoms.  There were no other focal findings, including facial asymmetry, motor, weakness in arms or legs, gait instability, or lack of coordination.     On exam  Awake, alert, oriented to month, date, year, president. Speaking fluently with normal word choice. Follows commands and crosses the midline. Able to spell his known name forward and reverse and calculate quarters in $1.50 briskly. Does give a comprehensive history.  Pupils 3–2 MM, full, VF's, conjugate gaze, full eye movements, no facial weakness, or dysarthria, tongue, midline, palate symmetric.  Motor: normal, bulk and tone with no drift, 5/5 biceps deltoids, triceps,  hip flexors.  Coordination: normal FNF  [place]: narrow base, negative Rhomberg, walking in place, heel and toel walking symmetric.    MRI brain images personally reviewed: no restricted diffusion. Focus of hypo intensity with flare, hyper intense signal surrounding it in the right sub insular cortex.    AP: 73 year old man with CAD prior TIA presenting with transient bizarre speech content in the setting of Covid infection, now resolved. Normal neurological exam, imaging showing chronic stroke.  Suspect transient delirium in the setting of systemic inflammatory condition. Already on dual antiplatelets for cardiac reasons. For neurology, single antiplatelet is sufficient at this point, but if there is a cardiac indication, not contraindicated for neurology.  No further neurological recommendations anticipated at this point. Please page or called neurology with any acute neuro changes or other issues we can help address.

## 2023-07-31 NOTE — ED ADULT NURSE NOTE - ED STAT RN HANDOFF TIME
20:47 Spironolactone Pregnancy And Lactation Text: This medication can cause feminization of the male fetus and should be avoided during pregnancy. The active metabolite is also found in breast milk.

## 2023-07-31 NOTE — ED CDU PROVIDER INITIAL DAY NOTE - PROGRESS NOTE DETAILS
CDU PROGRESS NOTE PAULINE BUCKLEY: Received pt at 1900 sign-out. Pt currently resting in stretcher in NAD. Case/plan reviewed. VSS. RVP COVID detected. Pt is aox3, speaking coherently, no focal neuro deficits. S1 S2 noted, RRR, lungs CTA b/l, BS x4 with soft, nontender abdomen. Pt without complaints. Will continue to monitor overnight, MRI/TTE in am.

## 2023-07-31 NOTE — ED PROVIDER NOTE - ATTENDING CONTRIBUTION TO CARE
Barbara- I performed a history and physical exam of the patient and discussed their management with the resident. I reviewed the resident's note and agree with the documented findings and plan of care, except as noted. My medical decision making and observations are as follows:    Patient was seen immediately on arrival because of a high probability of imminent or life threatening deterioration in the patient's condition.    73-year-old male with past medical history of hyperlipidemia, CAD with stents on aspirin and Plavix, presenting with episode of altered mental status around 1 PM.  Noted by family members to be speaking nonsensically over the phone, symptoms since resolved.  Complaining of mild intermittent headaches and fevers of 100.4 at home with associated runny nose, coughing, sore throat.  No sick contacts or recent travel.  No vomiting, abdominal pain, diarrhea, chest pain, shortness of breath.  On exam, A&O x 3, speaking coherently in full sentences, CN2-12 intact. 5/5 strength and intact sensation x4 extremities. Normal finger-nose testing. Steady independent gait. Negative Romberg. Negative pronator drift.    MDM-patient activated as code stroke, stroke imaging negative.  Neurology team recommending further evaluation with MRI, symptoms likely related to TIA.  Other differentials include metabolic encephalopathy secondary to viral illness, given upper respiratory symptoms.  Evaluating with labs, RPP, chest x-ray, UA. Febrile in ED, given Tylenol.     No leukocytosis. Hemoglobin within normal limits. Basic metabolic panel without significant electrolyte derangement or evidence of acute kidney injury. Liver function tests without significant abnormality. Urinalysis without evidence of infection.  Preliminary x-ray without evidence of consolidation.      On my independent interpretation of chest x-ray, possible slight bibasilar effusions, no consolidations.     EKG-- sinus rhythm, rate 78, normal axis, normal intervals, nonspecific ST flatting in interolateral leads. frequent PVCs      RPP positive for COVID.      Plan to admit patient to CDU for MRI. Accepted by CDU.

## 2023-07-31 NOTE — ED ADULT NURSE NOTE - OBJECTIVE STATEMENT
73y M Lois x4 came in for disoriented speech. Patient reports that earlier this afternoon he was on the phone with his wife when he began saying things that did not make sense. Patient also reports that he has had a headache and fevers on and off for the past week. Eyes are PERRL. No slurred speech or facial droop observed. Negative arm and leg drift bilaterally. Hand  is strong and equal bilaterally. Patient has no difficulties ambulating. Patient reports his speech is no longer not making sense but he still has a headache. Patient reports taking blood thinners. Denies blurry vision, chills, n/v/d, dysuria, weakness, numbness, tingling, SOB, and chest pain. Wife is present at bedside. Patient placed on cardiac monitor. Call bell within reach, bed in lowest position.

## 2023-07-31 NOTE — ED CDU PROVIDER DISPOSITION NOTE - ATTENDING APP SHARED VISIT CONTRIBUTION OF CARE
I Karri Thrasher MD have personally performed a face to face diagnostic evaluation on this patient.  I have reviewed the ACP note and agree with the history, exam, and plan of care, except as noted.   My medical decison making and observations are found above.  The patient is stable for discharge from CDU.  lungs clear, abd soft

## 2023-07-31 NOTE — ED ADULT NURSE NOTE - NSFALLHARMRISKINTERV_ED_ALL_ED

## 2023-08-01 VITALS
DIASTOLIC BLOOD PRESSURE: 64 MMHG | RESPIRATION RATE: 18 BRPM | SYSTOLIC BLOOD PRESSURE: 120 MMHG | OXYGEN SATURATION: 96 % | HEART RATE: 72 BPM

## 2023-08-01 LAB
CHOLEST SERPL-MCNC: 136 MG/DL — SIGNIFICANT CHANGE UP
HDLC SERPL-MCNC: 40 MG/DL — LOW
LIPID PNL WITH DIRECT LDL SERPL: 77 MG/DL — SIGNIFICANT CHANGE UP
NON HDL CHOLESTEROL: 96 MG/DL — SIGNIFICANT CHANGE UP
TRIGL SERPL-MCNC: 104 MG/DL — SIGNIFICANT CHANGE UP

## 2023-08-01 PROCEDURE — 99238 HOSP IP/OBS DSCHRG MGMT 30/<: CPT

## 2023-08-01 PROCEDURE — 0225U NFCT DS DNA&RNA 21 SARSCOV2: CPT

## 2023-08-01 PROCEDURE — 96374 THER/PROPH/DIAG INJ IV PUSH: CPT | Mod: XU

## 2023-08-01 PROCEDURE — G0378: CPT

## 2023-08-01 PROCEDURE — 82947 ASSAY GLUCOSE BLOOD QUANT: CPT

## 2023-08-01 PROCEDURE — 85610 PROTHROMBIN TIME: CPT

## 2023-08-01 PROCEDURE — 80061 LIPID PANEL: CPT

## 2023-08-01 PROCEDURE — 99285 EMERGENCY DEPT VISIT HI MDM: CPT | Mod: 25

## 2023-08-01 PROCEDURE — 83605 ASSAY OF LACTIC ACID: CPT

## 2023-08-01 PROCEDURE — 85025 COMPLETE CBC W/AUTO DIFF WBC: CPT

## 2023-08-01 PROCEDURE — 80053 COMPREHEN METABOLIC PANEL: CPT

## 2023-08-01 PROCEDURE — 81001 URINALYSIS AUTO W/SCOPE: CPT

## 2023-08-01 PROCEDURE — 84295 ASSAY OF SERUM SODIUM: CPT

## 2023-08-01 PROCEDURE — 85014 HEMATOCRIT: CPT

## 2023-08-01 PROCEDURE — 84132 ASSAY OF SERUM POTASSIUM: CPT

## 2023-08-01 PROCEDURE — 85730 THROMBOPLASTIN TIME PARTIAL: CPT

## 2023-08-01 PROCEDURE — 82330 ASSAY OF CALCIUM: CPT

## 2023-08-01 PROCEDURE — 70450 CT HEAD/BRAIN W/O DYE: CPT | Mod: XU,MA

## 2023-08-01 PROCEDURE — 70498 CT ANGIOGRAPHY NECK: CPT | Mod: MA

## 2023-08-01 PROCEDURE — 71045 X-RAY EXAM CHEST 1 VIEW: CPT

## 2023-08-01 PROCEDURE — 82962 GLUCOSE BLOOD TEST: CPT

## 2023-08-01 PROCEDURE — 84484 ASSAY OF TROPONIN QUANT: CPT

## 2023-08-01 PROCEDURE — 70551 MRI BRAIN STEM W/O DYE: CPT | Mod: 26,MA

## 2023-08-01 PROCEDURE — 99222 1ST HOSP IP/OBS MODERATE 55: CPT

## 2023-08-01 PROCEDURE — 70551 MRI BRAIN STEM W/O DYE: CPT | Mod: MA,XU

## 2023-08-01 PROCEDURE — 70496 CT ANGIOGRAPHY HEAD: CPT | Mod: MA

## 2023-08-01 PROCEDURE — 82803 BLOOD GASES ANY COMBINATION: CPT

## 2023-08-01 PROCEDURE — 85018 HEMOGLOBIN: CPT

## 2023-08-01 PROCEDURE — 83036 HEMOGLOBIN GLYCOSYLATED A1C: CPT

## 2023-08-01 PROCEDURE — 93005 ELECTROCARDIOGRAM TRACING: CPT

## 2023-08-01 PROCEDURE — 82435 ASSAY OF BLOOD CHLORIDE: CPT

## 2023-08-01 RX ADMIN — Medication 1000 MILLIGRAM(S): at 13:31

## 2023-08-01 RX ADMIN — Medication 81 MILLIGRAM(S): at 09:34

## 2023-08-01 RX ADMIN — FAMOTIDINE 40 MILLIGRAM(S): 10 INJECTION INTRAVENOUS at 09:34

## 2023-08-01 RX ADMIN — Medication 25 MILLIGRAM(S): at 09:33

## 2023-08-01 NOTE — ED ADULT NURSE REASSESSMENT NOTE - NS ED NURSE REASSESS COMMENT FT1
Pt received from TALIA Coronel.  Pt A&O x 4. Pt in CDU for telemonitoring, Echo, and CT coronary. Patient on tele, NSR, HR in 60's. Pt denies any chest pain, SOB, dizziness or palpitations. V/S stable, pt afebrile,  IV in place, patent and free of signs of infiltration. Pt resting in bed. Safety & comfort measures maintained. Call bell in reach. Will continue to monitor. Pt received from TALIA Coronel.  Pt A&O x 4. Pt in CDU for telemonitoring, Echo, and CT coronary. Patient on tele, NSR, HR in 60's. Pt denies any chest pain, SOB, dizziness or palpitations. V/S stable, pt afebrile,  IV in place, patent and free of signs of infiltration. Pt resting in bed. Airborne precautions continued for COVID. Safety & comfort measures maintained. Call bell in reach. Will continue to monitor.

## 2023-08-01 NOTE — ED CDU PROVIDER SUBSEQUENT DAY NOTE - PROGRESS NOTE DETAILS
Patient resting in bed comfortably in NAD. No new complaints. No neuro symptoms or return of symptoms overnight.  Most recent VSS. No events on telemetry monitor. Pt going to MRI now. Spoke with neuro, reports pt to be seen by stroke team around 1330. Spoke with neuro, coming to see patient now and recommending discharge. Pt did not yet get TTE, neuro ok with outpatient TTE, will cancel at this time. D/w Dr. Thrasher. Patient seen by neurology team, no acute intervention, recommending that patient continue ASA 81mg and f/u outpatient with Dr. Schwartz. D/w Dr. Thrasher.

## 2023-08-01 NOTE — ED CDU PROVIDER SUBSEQUENT DAY NOTE - CROS ED ROS STATEMENT
Detail Level: Detailed Depth Of Biopsy: dermis Was A Bandage Applied: Yes Size Of Lesion In Cm: 0 Biopsy Type: H and E Biopsy Method: Dermablade Anesthesia Type: 1% lidocaine with epinephrine Anesthesia Volume In Cc: 0.5 Hemostasis: Drysol Wound Care: Petrolatum Dressing: bandage Destruction After The Procedure: No Type Of Destruction Used: Curettage Curettage Text: The wound bed was treated with curettage after the biopsy was performed. all other ROS negative except as per HPI Cryotherapy Text: The wound bed was treated with cryotherapy after the biopsy was performed. Electrodesiccation Text: The wound bed was treated with electrodesiccation after the biopsy was performed. Electrodesiccation And Curettage Text: The wound bed was treated with electrodesiccation and curettage after the biopsy was performed. Silver Nitrate Text: The wound bed was treated with silver nitrate after the biopsy was performed. Lab: [id number] Lab Facility: [id number] Consent: Written consent was obtained and risks were reviewed including but not limited to scarring, infection, bleeding, scabbing, incomplete removal, nerve damage and allergy to anesthesia. Post-Care Instructions: I reviewed with the patient in detail post-care instructions. Patient is to keep the biopsy site dry overnight, and then apply bacitracin twice daily until healed. Patient may apply hydrogen peroxide soaks to remove any crusting. Notification Instructions: Patient will be notified of biopsy results. However, patient instructed to call the office if not contacted within 2 weeks. Billing Type: Third-Party Bill Information: Selecting Yes will display possible errors in your note based on the variables you have selected. This validation is only offered as a suggestion for you. PLEASE NOTE THAT THE VALIDATION TEXT WILL BE REMOVED WHEN YOU FINALIZE YOUR NOTE. IF YOU WANT TO FAX A PRELIMINARY NOTE YOU WILL NEED TO TOGGLE THIS TO 'NO' IF YOU DO NOT WANT IT IN YOUR FAXED NOTE.

## 2023-08-01 NOTE — ED CDU PROVIDER SUBSEQUENT DAY NOTE - HISTORY
CDU PROGRESS NOTE PAULINE SPEARAIN: Pt resting comfortably, feeling well without complaint. NAD, VSS. No events on telemetry. Pt is aox3, speaking coherently, no focal neuro deficits. Will continue to monitor, MRI pending. Pt COVID positive, no signs of respiratory distress. Stable on room air.

## 2023-08-01 NOTE — ED ADULT NURSE REASSESSMENT NOTE - NSFALLUNIVINTERV_ED_ALL_ED
Bed/Stretcher in lowest position, wheels locked, appropriate side rails in place/Call bell, personal items and telephone in reach/Instruct patient to call for assistance before getting out of bed/chair/stretcher/Non-slip footwear applied when patient is off stretcher/[place] to call system/Physically safe environment - no spills, clutter or unnecessary equipment/Purposeful proactive rounding/Room/bathroom lighting operational, light cord in reach

## 2023-08-01 NOTE — ED CDU PROVIDER SUBSEQUENT DAY NOTE - ATTENDING APP SHARED VISIT CONTRIBUTION OF CARE
I Karri Thrasher MD have personally performed a face to face diagnostic evaluation on this patient.  I have reviewed the ACP note and agree with the history, exam, and plan of care, except as noted.   My medical decison making and observations are found above.  The patient is stable for CDU.  Lungs clear, nl speech, moving all 4

## 2023-08-01 NOTE — ED CDU PROVIDER SUBSEQUENT DAY NOTE - CLINICAL SUMMARY MEDICAL DECISION MAKING FREE TEXT BOX
Anna Marie: In CDU after confusion episode. Now back to baseline. seen and examined. Patient with COVID. good O2 sat, would get results of MRI and discuss outpt care. Currently eating breakfast with no difficulties. has good follow up. Discussed covid treatments with pt.

## 2023-09-15 ASSESSMENT — KOOS JR
KOOS JR RAW SCORE: 9
STANDING UPRIGHT: MODERATE
BENDING TO THE FLOOR TO PICK UP OBJECT: MODERATE
STRAIGHTENING KNEE FULLY: MILD
KOOS JR RAW SCORE: 6
HOW SEVERE IS YOUR KNEE STIFFNESS AFTER FIRST WAKING IN MORNING: MILD
KOOS JR RAW SCORE: 15
TWISING OR PIVOTING ON KNEE: MODERATE
STANDING UPRIGHT: MILD
IMPORTED KOOS JR SCORE: 50.01
RISING FROM SITTING: SEVERE
BENDING TO THE FLOOR TO PICK UP OBJECT: MILD
STRAIGHTENING KNEE FULLY: MODERATE
TWISING OR PIVOTING ON KNEE: MILD
IMPORTED KOOS JR SCORE: 63.78
IMPORTED KOOS JR SCORE: 79.91
KOOS JR RAW SCORE: 3
IMPORTED FORM: YES
IMPORTED LATERALITY: LEFT
GOING UP OR DOWN STAIRS: MILD
IMPORTED KOOS JR SCORE: 70.7
HOW SEVERE IS YOUR KNEE STIFFNESS AFTER FIRST WAKING IN MORNING: MODERATE
GOING UP OR DOWN STAIRS: MODERATE
RISING FROM SITTING: MILD

## 2024-04-14 NOTE — H&P PST ADULT - ATTENDING COMMENTS
Hematocrit 43.2 42 - 52 %    MCV 87.8 78 - 100 FL    MCH 29.9 27 - 31 PG    MCHC 34.0 32.0 - 36.0 %    RDW 11.9 11.7 - 14.9 %    Platelets 212 140 - 440 K/CU MM    MPV 9.7 7.5 - 11.1 FL    Differential Type AUTOMATED DIFFERENTIAL     Neutrophils % 71.3 (H) 36 - 66 %    Lymphocytes % 16.2 (L) 24 - 44 %    Monocytes % 5.4 (H) 0 - 4 %    Eosinophils % 6.3 (H) 0 - 3 %    Basophils % 0.4 0 - 1 %    Segs Absolute 5.7 K/CU MM    Lymphocytes Absolute 1.3 K/CU MM    Monocytes Absolute 0.4 K/CU MM    Eosinophils Absolute 0.5 K/CU MM    Basophils Absolute 0.0 K/CU MM    Nucleated RBC % 0.0 %    Total Nucleated RBC 0.0 K/CU MM    Total Immature Neutrophil 0.03 K/CU MM    Immature Neutrophil % 0.4 0 - 0.43 %   Comprehensive Metabolic Panel   Result Value Ref Range    Sodium 132 (L) 135 - 145 MMOL/L    Potassium 3.8 3.5 - 5.1 MMOL/L    Chloride 99 99 - 110 mMol/L    CO2 22 21 - 32 MMOL/L    Anion Gap 11 7 - 16    Glucose 99 70 - 99 MG/DL    BUN 9 6 - 23 MG/DL    Creatinine 0.5 (L) 0.9 - 1.3 MG/DL    Est, Glom Filt Rate >90 >60 mL/min/1.73m2    Calcium 8.6 8.3 - 10.6 MG/DL    Total Protein 6.8 6.4 - 8.2 GM/DL    Albumin 4.0 3.4 - 5.0 GM/DL    Total Bilirubin 0.7 0.0 - 1.0 MG/DL    Alkaline Phosphatase 69 40 - 129 IU/L    ALT 14 10 - 40 U/L    AST 25 15 - 37 IU/L   Magnesium   Result Value Ref Range    Magnesium 2.0 1.8 - 2.4 mg/dl   EKG 12 Lead   Result Value Ref Range    Ventricular Rate 73 BPM    Atrial Rate 73 BPM    P-R Interval 168 ms    QRS Duration 80 ms    Q-T Interval 404 ms    QTc Calculation (Bazett) 445 ms    P Axis 29 degrees    R Axis 46 degrees    T Axis 40 degrees    Diagnosis       Normal sinus rhythm  Normal ECG  When compared with ECG of 27-OCT-2023 10:18,  No significant change was found        Radiographs (if obtained):  Radiologist's Report Reviewed:  No results found.        MDM:  CC/HPI Summary, DDx, ED Course, and Reassessment: Patient appears mildly anxious but is appropriate, he does not appear to be  left knee DJD  for left TKA

## 2024-06-25 NOTE — ED CDU PROVIDER INITIAL DAY NOTE - NSICDXPASTMEDICALHX_GEN_ALL_CORE_FT
Called and spoke with Dr. Calles regarding 10cc of urine out of the catheter.  Orders received to irrigate the catheter. Irrigated the catheter  per MD orders and a large blood clot removed from the cathter followed by 850 ml of bloody urine.  Pt stated she felt much better now. VISH fluid collected and sent to the lab per MD orders.   PAST MEDICAL HISTORY:  Hypercholesteremia     MI (Myocardial Infarction)

## 2025-02-11 ENCOUNTER — APPOINTMENT (OUTPATIENT)
Dept: MRI IMAGING | Facility: HOSPITAL | Age: 75
End: 2025-02-11

## 2025-02-11 ENCOUNTER — OUTPATIENT (OUTPATIENT)
Dept: OUTPATIENT SERVICES | Facility: HOSPITAL | Age: 75
LOS: 1 days | End: 2025-02-11
Payer: MEDICARE

## 2025-02-11 DIAGNOSIS — Z00.00 ENCOUNTER FOR GENERAL ADULT MEDICAL EXAMINATION WITHOUT ABNORMAL FINDINGS: ICD-10-CM

## 2025-02-11 DIAGNOSIS — Z95.0 PRESENCE OF CARDIAC PACEMAKER: ICD-10-CM

## 2025-02-11 DIAGNOSIS — H90.A21 SENSORINEURAL HEARING LOSS, UNILATERAL, RIGHT EAR, WITH RESTRICTED HEARING ON THE CONTRALATERAL SIDE: ICD-10-CM

## 2025-02-11 PROCEDURE — 71046 X-RAY EXAM CHEST 2 VIEWS: CPT

## 2025-02-11 PROCEDURE — 93287 PERI-PX DEVICE EVAL & PRGR: CPT | Mod: 26,76

## 2025-02-11 PROCEDURE — 70553 MRI BRAIN STEM W/O & W/DYE: CPT | Mod: MA

## 2025-02-11 PROCEDURE — 70553 MRI BRAIN STEM W/O & W/DYE: CPT | Mod: 26

## 2025-02-11 PROCEDURE — 71046 X-RAY EXAM CHEST 2 VIEWS: CPT | Mod: 26

## 2025-02-11 PROCEDURE — A9585: CPT

## 2025-02-12 ENCOUNTER — TRANSCRIPTION ENCOUNTER (OUTPATIENT)
Age: 75
End: 2025-02-12